# Patient Record
Sex: FEMALE | Race: WHITE | NOT HISPANIC OR LATINO | URBAN - METROPOLITAN AREA
[De-identification: names, ages, dates, MRNs, and addresses within clinical notes are randomized per-mention and may not be internally consistent; named-entity substitution may affect disease eponyms.]

---

## 2024-04-05 ENCOUNTER — INPATIENT (INPATIENT)
Facility: HOSPITAL | Age: 74
LOS: 5 days | Discharge: HOME | DRG: 690 | End: 2024-04-11
Attending: STUDENT IN AN ORGANIZED HEALTH CARE EDUCATION/TRAINING PROGRAM | Admitting: INTERNAL MEDICINE
Payer: MEDICARE

## 2024-04-05 VITALS
WEIGHT: 222.67 LBS | TEMPERATURE: 97 F | RESPIRATION RATE: 16 BRPM | HEIGHT: 65 IN | HEART RATE: 91 BPM | DIASTOLIC BLOOD PRESSURE: 84 MMHG | SYSTOLIC BLOOD PRESSURE: 151 MMHG | OXYGEN SATURATION: 88 %

## 2024-04-05 DIAGNOSIS — Z98.890 OTHER SPECIFIED POSTPROCEDURAL STATES: Chronic | ICD-10-CM

## 2024-04-05 DIAGNOSIS — Z95.1 PRESENCE OF AORTOCORONARY BYPASS GRAFT: Chronic | ICD-10-CM

## 2024-04-05 DIAGNOSIS — N39.0 URINARY TRACT INFECTION, SITE NOT SPECIFIED: ICD-10-CM

## 2024-04-05 LAB
ALBUMIN SERPL ELPH-MCNC: 3.7 G/DL — SIGNIFICANT CHANGE UP (ref 3.3–5.2)
ALP SERPL-CCNC: 142 U/L — HIGH (ref 40–120)
ALT FLD-CCNC: 9 U/L — SIGNIFICANT CHANGE UP
ANION GAP SERPL CALC-SCNC: 11 MMOL/L — SIGNIFICANT CHANGE UP (ref 5–17)
APPEARANCE UR: ABNORMAL
APTT BLD: 31.8 SEC — SIGNIFICANT CHANGE UP (ref 24.5–35.6)
AST SERPL-CCNC: 11 U/L — SIGNIFICANT CHANGE UP
BACTERIA # UR AUTO: ABNORMAL /HPF
BASE EXCESS BLDV CALC-SCNC: 5.2 MMOL/L — HIGH (ref -2–3)
BASOPHILS # BLD AUTO: 0.03 K/UL — SIGNIFICANT CHANGE UP (ref 0–0.2)
BASOPHILS NFR BLD AUTO: 0.2 % — SIGNIFICANT CHANGE UP (ref 0–2)
BILIRUB SERPL-MCNC: 0.4 MG/DL — SIGNIFICANT CHANGE UP (ref 0.4–2)
BILIRUB UR-MCNC: NEGATIVE — SIGNIFICANT CHANGE UP
BUN SERPL-MCNC: 27.5 MG/DL — HIGH (ref 8–20)
CA-I SERPL-SCNC: 1.2 MMOL/L — SIGNIFICANT CHANGE UP (ref 1.15–1.33)
CALCIUM SERPL-MCNC: 9.1 MG/DL — SIGNIFICANT CHANGE UP (ref 8.4–10.5)
CAST: 7 /LPF — HIGH (ref 0–4)
CHLORIDE BLDV-SCNC: 101 MMOL/L — SIGNIFICANT CHANGE UP (ref 96–108)
CHLORIDE SERPL-SCNC: 100 MMOL/L — SIGNIFICANT CHANGE UP (ref 96–108)
CO2 SERPL-SCNC: 29 MMOL/L — SIGNIFICANT CHANGE UP (ref 22–29)
COLOR SPEC: YELLOW — SIGNIFICANT CHANGE UP
CREAT SERPL-MCNC: 1.16 MG/DL — SIGNIFICANT CHANGE UP (ref 0.5–1.3)
DIFF PNL FLD: NEGATIVE — SIGNIFICANT CHANGE UP
EGFR: 49 ML/MIN/1.73M2 — LOW
EOSINOPHIL # BLD AUTO: 0.05 K/UL — SIGNIFICANT CHANGE UP (ref 0–0.5)
EOSINOPHIL NFR BLD AUTO: 0.4 % — SIGNIFICANT CHANGE UP (ref 0–6)
FLUAV AG NPH QL: SIGNIFICANT CHANGE UP
FLUBV AG NPH QL: SIGNIFICANT CHANGE UP
GAS PNL BLDV: 137 MMOL/L — SIGNIFICANT CHANGE UP (ref 136–145)
GAS PNL BLDV: SIGNIFICANT CHANGE UP
GLUCOSE BLDV-MCNC: 179 MG/DL — HIGH (ref 70–99)
GLUCOSE SERPL-MCNC: 170 MG/DL — HIGH (ref 70–99)
GLUCOSE UR QL: NEGATIVE MG/DL — SIGNIFICANT CHANGE UP
HCO3 BLDV-SCNC: 32 MMOL/L — HIGH (ref 22–29)
HCT VFR BLD CALC: 40.7 % — SIGNIFICANT CHANGE UP (ref 34.5–45)
HCT VFR BLDA CALC: 37 % — SIGNIFICANT CHANGE UP
HGB BLD CALC-MCNC: 12.4 G/DL — SIGNIFICANT CHANGE UP (ref 11.7–16.1)
HGB BLD-MCNC: 12.1 G/DL — SIGNIFICANT CHANGE UP (ref 11.5–15.5)
IMM GRANULOCYTES NFR BLD AUTO: 0.4 % — SIGNIFICANT CHANGE UP (ref 0–0.9)
INR BLD: 1.09 RATIO — SIGNIFICANT CHANGE UP (ref 0.85–1.18)
KETONES UR-MCNC: NEGATIVE MG/DL — SIGNIFICANT CHANGE UP
LACTATE BLDV-MCNC: 2.3 MMOL/L — HIGH (ref 0.5–2)
LEUKOCYTE ESTERASE UR-ACNC: ABNORMAL
LYMPHOCYTES # BLD AUTO: 1.58 K/UL — SIGNIFICANT CHANGE UP (ref 1–3.3)
LYMPHOCYTES # BLD AUTO: 11.5 % — LOW (ref 13–44)
MCHC RBC-ENTMCNC: 28.2 PG — SIGNIFICANT CHANGE UP (ref 27–34)
MCHC RBC-ENTMCNC: 29.7 GM/DL — LOW (ref 32–36)
MCV RBC AUTO: 94.9 FL — SIGNIFICANT CHANGE UP (ref 80–100)
MONOCYTES # BLD AUTO: 0.97 K/UL — HIGH (ref 0–0.9)
MONOCYTES NFR BLD AUTO: 7.1 % — SIGNIFICANT CHANGE UP (ref 2–14)
NEUTROPHILS # BLD AUTO: 11.05 K/UL — HIGH (ref 1.8–7.4)
NEUTROPHILS NFR BLD AUTO: 80.4 % — HIGH (ref 43–77)
NITRITE UR-MCNC: NEGATIVE — SIGNIFICANT CHANGE UP
NT-PROBNP SERPL-SCNC: 7419 PG/ML — HIGH (ref 0–300)
PCO2 BLDV: 70 MMHG — HIGH (ref 39–42)
PH BLDV: 7.27 — LOW (ref 7.32–7.43)
PH UR: 8.5 (ref 5–8)
PLATELET # BLD AUTO: 234 K/UL — SIGNIFICANT CHANGE UP (ref 150–400)
PO2 BLDV: 63 MMHG — HIGH (ref 25–45)
POTASSIUM BLDV-SCNC: 4.4 MMOL/L — SIGNIFICANT CHANGE UP (ref 3.5–5.1)
POTASSIUM SERPL-MCNC: 4.5 MMOL/L — SIGNIFICANT CHANGE UP (ref 3.5–5.3)
POTASSIUM SERPL-SCNC: 4.5 MMOL/L — SIGNIFICANT CHANGE UP (ref 3.5–5.3)
PROT SERPL-MCNC: 7 G/DL — SIGNIFICANT CHANGE UP (ref 6.6–8.7)
PROT UR-MCNC: 100 MG/DL
PROTHROM AB SERPL-ACNC: 12.1 SEC — SIGNIFICANT CHANGE UP (ref 9.5–13)
RBC # BLD: 4.29 M/UL — SIGNIFICANT CHANGE UP (ref 3.8–5.2)
RBC # FLD: 16 % — HIGH (ref 10.3–14.5)
RBC CASTS # UR COMP ASSIST: 3 /HPF — SIGNIFICANT CHANGE UP (ref 0–4)
RSV RNA NPH QL NAA+NON-PROBE: SIGNIFICANT CHANGE UP
SAO2 % BLDV: 91.4 % — SIGNIFICANT CHANGE UP
SARS-COV-2 RNA SPEC QL NAA+PROBE: SIGNIFICANT CHANGE UP
SODIUM SERPL-SCNC: 140 MMOL/L — SIGNIFICANT CHANGE UP (ref 135–145)
SP GR SPEC: 1.02 — SIGNIFICANT CHANGE UP (ref 1–1.03)
SQUAMOUS # UR AUTO: >36 /HPF — HIGH (ref 0–5)
TROPONIN T, HIGH SENSITIVITY RESULT: 62 NG/L — HIGH (ref 0–51)
TROPONIN T, HIGH SENSITIVITY RESULT: 70 NG/L — HIGH (ref 0–51)
UROBILINOGEN FLD QL: 1 MG/DL — SIGNIFICANT CHANGE UP (ref 0.2–1)
WBC # BLD: 13.74 K/UL — HIGH (ref 3.8–10.5)
WBC # FLD AUTO: 13.74 K/UL — HIGH (ref 3.8–10.5)
WBC UR QL: 440 /HPF — HIGH (ref 0–5)

## 2024-04-05 PROCEDURE — 71275 CT ANGIOGRAPHY CHEST: CPT | Mod: 26,MC

## 2024-04-05 PROCEDURE — 99223 1ST HOSP IP/OBS HIGH 75: CPT | Mod: GC

## 2024-04-05 PROCEDURE — 93010 ELECTROCARDIOGRAM REPORT: CPT

## 2024-04-05 PROCEDURE — 71045 X-RAY EXAM CHEST 1 VIEW: CPT | Mod: 26

## 2024-04-05 PROCEDURE — 74177 CT ABD & PELVIS W/CONTRAST: CPT | Mod: 26,MC

## 2024-04-05 PROCEDURE — 99285 EMERGENCY DEPT VISIT HI MDM: CPT | Mod: GC

## 2024-04-05 RX ORDER — ALPRAZOLAM 0.25 MG
1 TABLET ORAL
Refills: 0 | DISCHARGE

## 2024-04-05 RX ORDER — GABAPENTIN 400 MG/1
1 CAPSULE ORAL
Refills: 0 | DISCHARGE

## 2024-04-05 RX ORDER — TIZANIDINE 4 MG/1
1 TABLET ORAL
Refills: 0 | DISCHARGE

## 2024-04-05 RX ORDER — LANOLIN ALCOHOL/MO/W.PET/CERES
3 CREAM (GRAM) TOPICAL AT BEDTIME
Refills: 0 | Status: DISCONTINUED | OUTPATIENT
Start: 2024-04-05 | End: 2024-04-11

## 2024-04-05 RX ORDER — LEVOTHYROXINE SODIUM 125 MCG
1 TABLET ORAL
Refills: 0 | DISCHARGE

## 2024-04-05 RX ORDER — CEFTRIAXONE 500 MG/1
1000 INJECTION, POWDER, FOR SOLUTION INTRAMUSCULAR; INTRAVENOUS EVERY 24 HOURS
Refills: 0 | Status: DISCONTINUED | OUTPATIENT
Start: 2024-04-06 | End: 2024-04-07

## 2024-04-05 RX ORDER — ONDANSETRON 8 MG/1
4 TABLET, FILM COATED ORAL EVERY 8 HOURS
Refills: 0 | Status: DISCONTINUED | OUTPATIENT
Start: 2024-04-05 | End: 2024-04-11

## 2024-04-05 RX ORDER — BUDESONIDE, GLYCOPYRROLATE, AND FORMOTEROL FUMARATE 160; 9; 4.8 UG/1; UG/1; UG/1
2 AEROSOL, METERED RESPIRATORY (INHALATION)
Refills: 0 | DISCHARGE

## 2024-04-05 RX ORDER — PROCHLORPERAZINE MALEATE 5 MG
1 TABLET ORAL
Refills: 0 | DISCHARGE

## 2024-04-05 RX ORDER — EZETIMIBE 10 MG/1
1 TABLET ORAL
Refills: 0 | DISCHARGE

## 2024-04-05 RX ORDER — ACETAMINOPHEN 500 MG
650 TABLET ORAL EVERY 6 HOURS
Refills: 0 | Status: DISCONTINUED | OUTPATIENT
Start: 2024-04-05 | End: 2024-04-11

## 2024-04-05 RX ORDER — MONTELUKAST 4 MG/1
1 TABLET, CHEWABLE ORAL
Refills: 0 | DISCHARGE

## 2024-04-05 RX ORDER — ENOXAPARIN SODIUM 100 MG/ML
40 INJECTION SUBCUTANEOUS EVERY 24 HOURS
Refills: 0 | Status: DISCONTINUED | OUTPATIENT
Start: 2024-04-05 | End: 2024-04-11

## 2024-04-05 RX ORDER — SPIRONOLACTONE 25 MG/1
1 TABLET, FILM COATED ORAL
Refills: 0 | DISCHARGE

## 2024-04-05 RX ORDER — DICLOFENAC SODIUM 30 MG/G
1 GEL TOPICAL
Refills: 0 | DISCHARGE

## 2024-04-05 RX ORDER — METOPROLOL TARTRATE 50 MG
1 TABLET ORAL
Refills: 0 | DISCHARGE

## 2024-04-05 RX ORDER — ACETAMINOPHEN 500 MG
1000 TABLET ORAL ONCE
Refills: 0 | Status: COMPLETED | OUTPATIENT
Start: 2024-04-05 | End: 2024-04-05

## 2024-04-05 RX ORDER — TRAZODONE HCL 50 MG
1 TABLET ORAL
Refills: 0 | DISCHARGE

## 2024-04-05 RX ORDER — INSULIN GLARGINE 100 [IU]/ML
45 INJECTION, SOLUTION SUBCUTANEOUS
Refills: 0 | DISCHARGE

## 2024-04-05 RX ORDER — ATORVASTATIN CALCIUM 80 MG/1
1 TABLET, FILM COATED ORAL
Refills: 0 | DISCHARGE

## 2024-04-05 RX ORDER — OMEPRAZOLE 10 MG/1
1 CAPSULE, DELAYED RELEASE ORAL
Refills: 0 | DISCHARGE

## 2024-04-05 RX ORDER — SODIUM CHLORIDE 9 MG/ML
1000 INJECTION INTRAMUSCULAR; INTRAVENOUS; SUBCUTANEOUS ONCE
Refills: 0 | Status: COMPLETED | OUTPATIENT
Start: 2024-04-05 | End: 2024-04-05

## 2024-04-05 RX ORDER — SODIUM CHLORIDE 9 MG/ML
1000 INJECTION INTRAMUSCULAR; INTRAVENOUS; SUBCUTANEOUS ONCE
Refills: 0 | Status: DISCONTINUED | OUTPATIENT
Start: 2024-04-05 | End: 2024-04-05

## 2024-04-05 RX ORDER — MORPHINE SULFATE 50 MG/1
4 CAPSULE, EXTENDED RELEASE ORAL ONCE
Refills: 0 | Status: DISCONTINUED | OUTPATIENT
Start: 2024-04-05 | End: 2024-04-05

## 2024-04-05 RX ORDER — METFORMIN HYDROCHLORIDE 850 MG/1
1 TABLET ORAL
Refills: 0 | DISCHARGE

## 2024-04-05 RX ORDER — CEFTRIAXONE 500 MG/1
1000 INJECTION, POWDER, FOR SOLUTION INTRAMUSCULAR; INTRAVENOUS ONCE
Refills: 0 | Status: COMPLETED | OUTPATIENT
Start: 2024-04-05 | End: 2024-04-05

## 2024-04-05 RX ORDER — FOLIC ACID 0.8 MG
1 TABLET ORAL
Refills: 0 | DISCHARGE

## 2024-04-05 RX ADMIN — SODIUM CHLORIDE 1000 MILLILITER(S): 9 INJECTION INTRAMUSCULAR; INTRAVENOUS; SUBCUTANEOUS at 12:46

## 2024-04-05 RX ADMIN — MORPHINE SULFATE 4 MILLIGRAM(S): 50 CAPSULE, EXTENDED RELEASE ORAL at 18:39

## 2024-04-05 RX ADMIN — MORPHINE SULFATE 4 MILLIGRAM(S): 50 CAPSULE, EXTENDED RELEASE ORAL at 16:41

## 2024-04-05 RX ADMIN — CEFTRIAXONE 1000 MILLIGRAM(S): 500 INJECTION, POWDER, FOR SOLUTION INTRAMUSCULAR; INTRAVENOUS at 12:46

## 2024-04-05 NOTE — H&P ADULT - NSHPPHYSICALEXAM_GEN_ALL_CORE
Vital Signs Last 24 Hrs  T(C): 36.4 (05 Apr 2024 21:23), Max: 36.9 (05 Apr 2024 12:27)  T(F): 97.6 (05 Apr 2024 21:23), Max: 98.5 (05 Apr 2024 12:27)  HR: 79 (05 Apr 2024 21:23) (74 - 91)  BP: 136/77 (05 Apr 2024 21:23) (126/60 - 151/84)  BP(mean): --  RR: 19 (05 Apr 2024 21:23) (16 - 19)  SpO2: 97% (05 Apr 2024 21:23) (85% - 98%)    Parameters below as of 05 Apr 2024 21:23  Patient On (Oxygen Delivery Method): room air    GENERAL: Obese, awake, alert, oriented to time, place and person, nasal cannula in place, appears to be in mild distress due to pain.  HEENT: Atraumatic, normocephalic, non-icteric, no JVD  NEURO: No focal deficits, moving all extremities spontaneously, A&Ox3, no dysarthria, CN II-XII grossly intact  PSYCH: Normal affect, calm, appropriate insight and judgment, fluent speech  LUNGS: CTAB, no wrr, non-labored breathing  HEART: RRR, no murmur appreciated  ABD: Soft, tender to palpation in LLQ, non-distended, no organomegaly, no appreciable masses, +bs all 4 quadrants  EXTREMITIES: Nontender, no clubbing, cyanosis, or edema  SKIN: No rashes or lesions  LYMPH: No adenopathy or splenomegaly Vital Signs Last 24 Hrs  T(C): 36.4 (05 Apr 2024 21:23), Max: 36.9 (05 Apr 2024 12:27)  T(F): 97.6 (05 Apr 2024 21:23), Max: 98.5 (05 Apr 2024 12:27)  HR: 79 (05 Apr 2024 21:23) (74 - 91)  BP: 136/77 (05 Apr 2024 21:23) (126/60 - 151/84)  BP(mean): --  RR: 19 (05 Apr 2024 21:23) (16 - 19)  SpO2: 97% (05 Apr 2024 21:23) (85% - 98%)    Parameters below as of 05 Apr 2024 21:23  Patient On (Oxygen Delivery Method): room air    GENERAL: Obese, awake, alert, oriented to time, place and person, nasal cannula in place, appears to be in mild distress due to pain.  HEENT: Atraumatic, normocephalic, non-icteric, no JVD  NEURO: No focal deficits, moving all extremities spontaneously, A&Ox3, no dysarthria, right hand tremor noted  PSYCH: Normal affect, calm, appropriate insight and judgment, fluent speech  LUNGS: CTAB, no wrr  HEART: RRR, no murmur appreciated  ABD: Soft, tender to palpation in LLQ, non-distended, no organomegaly, no appreciable masses, +bs all 4 quadrants  EXTREMITIES: Nontender, no clubbing, cyanosis, or edema  SKIN: No rashes or lesions  LYMPH: No adenopathy or splenomegaly

## 2024-04-05 NOTE — H&P ADULT - NSHPLABSRESULTS_GEN_ALL_CORE
LABS:                          12.1   13.74 )-----------( 234      ( 2024 12:20 )             40.7     04-    140  |  100  |  27.5<H>  ----------------------------<  170<H>  4.5   |  29.0  |  1.16    Ca    9.1      2024 12:20    TPro  7.0  /  Alb  3.7  /  TBili  0.4  /  DBili  x   /  AST  11  /  ALT  9   /  AlkPhos  142<H>  04-05    LIVER FUNCTIONS - ( 2024 12:20 )  Alb: 3.7 g/dL / Pro: 7.0 g/dL / ALK PHOS: 142 U/L / ALT: 9 U/L / AST: 11 U/L / GGT: x           PT/INR - ( 2024 12:20 )   PT: 12.1 sec;   INR: 1.09 ratio         PTT - ( 2024 12:20 )  PTT:31.8 sec  Urinalysis Basic - ( 2024 17:28 )    Color: Yellow / Appearance: Turbid / S.025 / pH: x  Gluc: x / Ketone: Negative mg/dL  / Bili: Negative / Urobili: 1.0 mg/dL   Blood: x / Protein: 100 mg/dL / Nitrite: Negative   Leuk Esterase: Large / RBC: 3 /HPF /  /HPF   Sq Epi: x / Non Sq Epi: >36 /HPF / Bacteria: Many /HPF      < from: CT Abdomen and Pelvis w/ IV Cont (24 @ 16:11) >        < end of copied text >    < from: CT Abdomen and Pelvis w/ IV Cont (24 @ 16:11) >    IMPRESSION:  No pulmonary embolism.    Dilated main pulmonary artery measuring 3.9 cm, a nonspecific finding   which can be seen in the setting of pulmonary hypertension.    Possible 3.5 cm right adnexal mass. Additionally, there is fluid and   debris within the endometrial canal which is an atypical finding fora   patient of this age. Recommend pelvic ultrasound for further assessment.   Neoplasm cannot be excluded.    --- End of Report ---    < end of copied text >

## 2024-04-05 NOTE — ED PROVIDER NOTE - CLINICAL SUMMARY MEDICAL DECISION MAKING FREE TEXT BOX
Sepsis/UTI work up, r/o PE. Will check cbc, cmp, coags, trop T, cxr, UA. ECG. Will check vbg/lac, swab, cultures. Will check CTA chest, CT ab/p with iv con. Given Rocephin 1g, IVF. Symptom management and reassess.

## 2024-04-05 NOTE — ED PROVIDER NOTE - OBJECTIVE STATEMENT
A 72 yo F with hx of chronic lower back pain 2/2 spinal stenosis and sciatica, hx of chronic UTI, DM, CABG 5 years ago, not on AC, SAS on CPAP prn at night.  presents to the ED c/o L francine pain and dysuria since 2-3 days ago. Also reports shortness of breath and LLQ abd pain. Denies fevers, chills, headache, chest pain, palpitations, cough, nausea, vomiting, diarrhea, hematuria, dark stools, focal neurologic symptoms.

## 2024-04-05 NOTE — ED ADULT TRIAGE NOTE - CHIEF COMPLAINT QUOTE
as per  pt has been having chronic UTI last 4 days was severe, pt on antibionts po with no relief, also pt might be hypoxia because she is lethargy and confused for about 1 day. pt uses CPAP at night and on oxygen PRN.

## 2024-04-05 NOTE — H&P ADULT - ASSESSMENT
73F with PMHx Chronic lower back pain secondary to Spinal stenosis and Sciatica, Neuropathy, Recurrent UTIs, DM, CAD s/p CABGx4 (2019) and CardioMEMS, ROMANA on CPAP, Hypothyroidism, Carpal tunnel syndrome presents to the ED with complaint of L flank pain and dysuria. Associated shortness of breath. CT showing elevated Trops, positive UA, CT scan with right adnexal mass.     73F with PMHx Chronic lower back pain secondary to Spinal stenosis and Sciatica, Neuropathy, Recurrent UTIs, DM, CAD s/p CABGx4 (2019) and CardioMEMS, ROMANA on CPAP, Hypothyroidism, Carpal tunnel syndrome presents to the ED with complaint of L flank pain and dysuria. Associated shortness of breath. CT showing elevated Trops, positive UA, CT scan with right adnexal mass.    # UTI  - UA positive   73F with PMHx Chronic lower back pain secondary to Spinal stenosis and Sciatica, Neuropathy, Recurrent UTIs, DM, CAD s/p CABGx4 (2019) and CardioMEMS, ROMANA on CPAP, Hypothyroidism, Carpal tunnel syndrome presents to the ED with complaint of L flank pain and dysuria. Associated shortness of breath. CT showing elevated Trops, positive UA, CT scan with right adnexal mass.    # Recurrent UTI  - UA positive  - CT negative for pyelo, showing right adnexal mass  - F/u BCx , UCx  - CXR  - Continue Ceftriaxone  - Follow up cultures  - Trend WBC       73F with PMHx Chronic lower back pain secondary to Spinal stenosis and Sciatica, Neuropathy, Recurrent UTIs, DM, CAD s/p CABGx4 (2019) and CardioMEMS, ROMANA on CPAP, Hypothyroidism, Carpal tunnel syndrome presents to the ED with complaint of L flank pain and dysuria. Associated shortness of breath. CT showing elevated Trops, positive UA, CT scan with right adnexal mass.    PLAN:  # Recurrent UTI  - UA positive  - CT negative for pyelo, showing right adnexal mass  - F/u BCx , UCx  - Continue Ceftriaxone  - Follow up cultures  - Leukocytosis  - Trend WBC    # CAD w/ elevated Troponin  - Troponin 70 on admission, 62 on    # Acute hypoxic respiratory failure   - Patient with history of ROMANA and noncompliance with CPAP  - Lactate elevated in the ED normalized s/p IV fluid bolus   73F with PMHx Chronic lower back pain secondary to Spinal stenosis and Sciatica, Neuropathy, Recurrent UTIs, DM, CAD s/p CABGx4 (2019) and CardioMEMS, ROMANA on CPAP, Hypothyroidism, Carpal tunnel syndrome presents to the ED with complaint of L flank pain and dysuria. Associated shortness of breath. CT showing elevated Trops, positive UA, CT scan with right adnexal mass.    PLAN:  # Recurrent UTI  - UA positive  - CT negative for pyelo, showing right adnexal mass  - F/u BCx , UCx  - Continue Ceftriaxone  - Follow up cultures  - Leukocytosis  - Trend WBC    # CAD w/ elevated Troponin  - Troponin 70 on admission, 62 on repeat  - No evidence of volume overload  - TTE ordered to assess LV function  - Seen by cardiology, recs appreciated    # Acute hypoxic respiratory failure   - Patient with history of ROMANA and noncompliance with CPAP  - Lactate elevated in the ED normalized s/p IV fluid bolus   73F with PMHx Chronic lower back pain secondary to Spinal stenosis and Sciatica, Neuropathy, Recurrent UTIs, DM, CAD s/p CABGx4 (2019) and CardioMEMS, ROMANA on CPAP, Hypothyroidism, Carpal tunnel syndrome presents to the ED with complaint of L flank pain and dysuria. Associated shortness of breath. CT showing elevated Trops, positive UA, CT scan with right adnexal mass.    PLAN:  # Recurrent UTI  - UA positive  - CT negative for pyelo, showing right adnexal mass  - F/u BCx , UCx  - Continue Ceftriaxone  - Follow up cultures  - Leukocytosis  - Trend WBC    # CAD w/ elevated Troponin  - Troponin 70 on admission, 62 on repeat  - No evidence of volume overload  - TTE ordered to assess LV function  - Seen by cardiology, recs appreciated    # Acute hypoxic respiratory failure   - Patient with history of ROMANA and noncompliance with CPAP  - Lactate elevated in the ED normalized s/p IV fluid bolus  - Supplemental O2 wean as tolerated  - Nocturnal BIPAP  - Symbicort      # Chronic back pain  # Spinal stenosis  # Sciatica  - Continue Gabapentin  - Continue Percocet    # Diabetes  - HbA1c  - Takes Lantus 45 at home, Will order Lantus 20 Units daily  - ISS moderate scale  - Hold Metformin   73F with PMHx Chronic lower back pain secondary to Spinal stenosis and Sciatica, Neuropathy, Recurrent UTIs, DM, HFpEF, CAD s/p CABGx4 (2019) and CardioMEMS, ROMANA on CPAP, Hypothyroidism, Carpal tunnel syndrome presents to the ED with complaint of L flank pain and dysuria. Associated shortness of breath. CT showing elevated Trops, positive UA, CT scan with right adnexal mass.    PLAN:  # Recurrent UTI  - UA positive  - CT negative for pyelo, showing right adnexal mass  - F/u BCx , UCx  - Continue Ceftriaxone  - Follow up cultures  - Leukocytosis 13.74 on admission, 11.35 on repeat  - Trend WBC    # CAD w/ elevated Troponin  # HFpEF  - Troponin 70 on admission, 62 on repeat  - Xray suggestive of heart enlargement  - No evidence of volume overload  - TTE ordered to assess LV function  - Seen by cardiology, recs appreciated  - Cardio to evaluate CardioMEMS  - Continue Metoprolol, Spironolactone, Torsemide    # Acute hypoxic respiratory failure   # Obstructive sleep apnea  - Noncompliance with CPAP  - Lactate elevated in the ED normalized s/p IV fluid bolus  - Supplemental O2 wean as tolerated  - Nocturnal BIPAP  - Symbicort      # Chronic back pain  # Spinal stenosis  # Sciatica  - Continue Gabapentin  - Continue Percocet    # Diabetes  - HbA1c  - Takes Lantus 45 at home, Will order Lantus 20 Units daily  - ISS moderate scale  - Hold Metformin    # HLD  - Continue Atorvastatin    # Hypothyroidism  - Continue Levothyroxine    VTE ppx: Lovenox  Diet: DASH/TLC, Consistent carb   73F with PMHx Chronic lower back pain secondary to Spinal stenosis and Sciatica, Neuropathy, Recurrent UTIs, DM, HFpEF, CAD s/p CABGx4 (2019) and CardioMEMS, ROMANA on CPAP, Hypothyroidism, Carpal tunnel syndrome presents to the ED with complaint of L flank pain and dysuria. Associated shortness of breath. CT showing elevated Trops, positive UA, CT scan with right adnexal mass.    PLAN:  # Recurrent UTI  - UA positive  - CT negative for pyelo, showing right adnexal mass  - F/u BCx , UCx  - Continue Ceftriaxone  - Follow up cultures  - Abfebrile  - Leukocytosis 13.74 on admission, 11.35 on repeat  - Trend WBC    # CAD w/ elevated Troponin  # HFpEF  - Troponin 70 on admission, 62 on repeat  - Xray suggestive of heart enlargement  - No evidence of volume overload  - TTE ordered to assess LV function  - Seen by cardiology, recs appreciated  - Cardio to evaluate CardioMEMS  - Continue Metoprolol, Spironolactone, Torsemide    # Acute hypoxic Hypercapnic respiratory failure   # Obstructive sleep apnea  - Noncompliance with CPAP  - Lactate elevated in the ED normalized s/p IV fluid bolus  - Supplemental O2 wean as tolerated  - Hypercapnia on admission  - Nocturnal BIPAP  - Repeat ABG post BIPAP  - Symbicort, Montelukast     # Right adnexal mass  - Pelvic ultrasound    # Chronic back pain  # Spinal stenosis  # Sciatica  - Continue Gabapentin  - Continue Percocet    # Diabetes  - HbA1c  - Takes Lantus 45 at home, Will order Lantus 20 Units daily  - ISS moderate scale  - Carb consistent diet  - Hold Metformin    # HLD  - Continue Atorvastatin    # Hypothyroidism  - Continue Levothyroxine    # Anxiety   - PRN Xanax    # Rash  - Nystatin powder    - Fall precautions given previous hx of fall    VTE ppx: Lovenox  Diet: DASH/TLC, Consistent carb   73F with PMHx Chronic lower back pain secondary to Spinal stenosis and Sciatica, Neuropathy, Recurrent UTIs, DM, HFpEF, CAD s/p CABGx4 (2019) and CardioMEMS, ROMANA on CPAP, Hypothyroidism, Carpal tunnel syndrome presents to the ED with complaint of L flank pain and dysuria. Associated shortness of breath. CT showing elevated Trops, positive UA, CT scan with right adnexal mass.    PLAN:  # Recurrent UTI  - UA positive  - CT negative for pyelo, showing right adnexal mass  - F/u BCx , UCx  - Continue Ceftriaxone  - Follow up cultures  - Abfebrile  - Leukocytosis 13.74 on admission, 11.35 on repeat  - Trend WBC    # CAD w/ elevated Troponin  # HFpEF  - Troponin 70 on admission, 62 on repeat  - Xray suggestive of heart enlargement  - No evidence of volume overload  - TTE ordered to assess LV function  - Seen by cardiology, recs appreciated  - Cardio to evaluate CardioMEMS  - Continue Metoprolol, Spironolactone, Torsemide    # Acute hypoxic Hypercapnic respiratory failure   # Obstructive sleep apnea  # Pulm HTN  - Noncompliance with CPAP  - Lactate elevated in the ED normalized s/p IV fluid bolus  - Supplemental O2 wean as tolerated  - Hypercapnia on admission  - Nocturnal BIPAP  - Repeat ABG post BIPAP  - Symbicort, Montelukast     # Right adnexal mass  - Pelvic ultrasound    # Chronic back pain  # Spinal stenosis  # Sciatica  - Continue Gabapentin  - Continue Percocet    # Diabetes  - HbA1c  - Takes Lantus 45 at home, Will order Lantus 20 Units daily  - ISS moderate scale  - Carb consistent diet  - Hold Metformin    # HLD  - Continue Atorvastatin    # Hypothyroidism  - Continue Levothyroxine    # Anxiety   - PRN Xanax    # Rash  - Nystatin powder    - Fall precautions given previous hx of fall    VTE ppx: Lovenox  Diet: DASH/TLC, Consistent carb

## 2024-04-05 NOTE — CONSULT NOTE ADULT - SUBJECTIVE AND OBJECTIVE BOX
[place]                                      [facility]                              [address] [place] [address]                                                 PHONE: [phone]                                                 FAX: [phone]  ------------------------------------------------------------------------------------------------    Chief complaint: weakness    73y Female with past medical history as under presents to the ED c/o L francine pain and dysuria since 2-3 days ago. Also reports shortness of breath and LLQ abd pain.  at bedside provides most of the history. He reports that pt was being treated for UTI and he noted that pt was getting more weak and lethargic.   At the time of evaluation, pt remains in bed and is arousable but goes back to sleep.    PAST MEDICAL & SURGICAL HISTORY:      penicillin (Unknown)  Dilaudid (Unknown)  Kiwi (Unknown)      Vital Signs Last 24 Hrs  T(C): 36.9 (05 Apr 2024 12:27), Max: 36.9 (05 Apr 2024 12:27)  T(F): 98.5 (05 Apr 2024 12:27), Max: 98.5 (05 Apr 2024 12:27)  HR: 85 (05 Apr 2024 12:27) (85 - 91)  BP: 126/60 (05 Apr 2024 12:27) (126/60 - 151/84)  BP(mean): --  RR: 18 (05 Apr 2024 12:27) (16 - 18)  SpO2: 95% (05 Apr 2024 12:27) (85% - 95%)    Parameters below as of 05 Apr 2024 12:27  Patient On (Oxygen Delivery Method): room air        RELEVANT PHYSICAL EXAM:  Neck: No obvious JVD  Cardiovascular: regular S1, S2  Respiratory: Lungs clear to auscultation; no crepitations, no wheeze  Musculoskeletal: No edema    LABS:                        12.1   13.74 )-----------( 234      ( 05 Apr 2024 12:20 )             40.7     04-05    140  |  100  |  27.5<H>  ----------------------------<  170<H>  4.5   |  29.0  |  1.16    Ca    9.1      05 Apr 2024 12:20    TPro  7.0  /  Alb  3.7  /  TBili  0.4  /  DBili  x   /  AST  11  /  ALT  9   /  AlkPhos  142<H>  04-05    PT/INR - ( 05 Apr 2024 12:20 )   PT: 12.1 sec;   INR: 1.09 ratio         PTT - ( 05 Apr 2024 12:20 )  PTT:31.8 sec    RADIOLOGY & ADDITIONAL STUDIES: (reviewed)  CXR was independently visualized/reviewed and demonstrated: cardiomegaly    CARDIOLOGY TESTING:(reviewed)     ECG (Independent visualization): NSR with PRWP    # Echo:  Echo 11/17/2021 - LVEF 60-65%, IVSD 1.0, PW 0.9, RV nl, trace MR, no AI, trace TR      # RHC/Cardiomems 12/1/2022  Phase I - Baseline 3L O2  BP:175/81/105  PA 47/17/28  W 20      # LHC:  8/13/2020  AO: 128/61 87  LV: 125/13 Left Ventricular End Diastolic Pressure: 22  RA: 14/15 12  RV: 58/10 16  PA: 55/22 36  PW: 19/19 17  Chong CO: 7.46 Chong CI: 3.62  PVR: 204 TPVR: 386 SVR: 804 TSVR: 933  AO Sat: 94  PA Sat: 74     DOMINANCE: Right Dominant  LEFT MAIN: Angiographically Normal  LEFT ANTERIOR DESCENDING ARTERY:  MID LAD: is occluded  CIRCUMFLEX ARTERY:  Mild luminal irregularities less than 30%  RIGHT CORONARY ARTERY:  Mild luminal irregularities less than 30%  GRAFTS:  Sequential graft from the 1st OMto the 2nd OMis totally occluded  LIMA graft to the LAD Not visualized due to access issues limiting approach to Right  radial      # LE duplex 11/17/2021  No evidence of deep venous thrombosis in the right and left common femoral through popliteal and portion of calf tibial veins.    TELEMETRY independently visualized/reviewed and demonstrated : NSR    MEDICATIONS:(reviewed)  Home Medications:    MEDICATIONS  (STANDING):    MEDICATIONS  (PRN):      ASSESSMENT AND PLAN:    73y Female with prior history of  HFPEF, CAD s/p CABG, pHTN, ROMANA with Oct 2021 admission [facility] for chronic hypoxemic/hypercapneic respiratory failure requiring intubations/p Cardiomems  presents to the ED c/o L francine pain and dysuria since 2-3 days ago. Also reports shortness of breath and LLQ abd pain.  at bedside provides most of the history. He reports that pt was being treated for UTI and he noted that pt was getting more weak and lethargic.   Noted to have elevated troponin.                                                    [facility]                              [address] [place] [address]                                                 PHONE: [phone]                                                 FAX: [phone]  ------------------------------------------------------------------------------------------------    Chief complaint: weakness    73y Female with past medical history as under presents to the ED c/o L francine pain and dysuria since 2-3 days ago. Also reports shortness of breath and LLQ abd pain.  at bedside provides most of the history. He reports that pt was being treated for UTI and he noted that pt was getting more weak and lethargic. Noted to have elevated troponin.  At the time of evaluation, pt remains in bed and is arousable but goes back to sleep.    PAST MEDICAL & SURGICAL HISTORY:      penicillin (Unknown)  Dilaudid (Unknown)  Kiwi (Unknown)      Vital Signs Last 24 Hrs  T(C): 36.9 (05 Apr 2024 12:27), Max: 36.9 (05 Apr 2024 12:27)  T(F): 98.5 (05 Apr 2024 12:27), Max: 98.5 (05 Apr 2024 12:27)  HR: 85 (05 Apr 2024 12:27) (85 - 91)  BP: 126/60 (05 Apr 2024 12:27) (126/60 - 151/84)  BP(mean): --  RR: 18 (05 Apr 2024 12:27) (16 - 18)  SpO2: 95% (05 Apr 2024 12:27) (85% - 95%)    Parameters below as of 05 Apr 2024 12:27  Patient On (Oxygen Delivery Method): room air        RELEVANT PHYSICAL EXAM:  Neck: No obvious JVD  Cardiovascular: regular S1, S2  Respiratory: Lungs clear to auscultation; no crepitations, no wheeze  Musculoskeletal: No edema    LABS:                        12.1   13.74 )-----------( 234      ( 05 Apr 2024 12:20 )             40.7     04-05    140  |  100  |  27.5<H>  ----------------------------<  170<H>  4.5   |  29.0  |  1.16    Ca    9.1      05 Apr 2024 12:20    TPro  7.0  /  Alb  3.7  /  TBili  0.4  /  DBili  x   /  AST  11  /  ALT  9   /  AlkPhos  142<H>  04-05    PT/INR - ( 05 Apr 2024 12:20 )   PT: 12.1 sec;   INR: 1.09 ratio         PTT - ( 05 Apr 2024 12:20 )  PTT:31.8 sec    RADIOLOGY & ADDITIONAL STUDIES: (reviewed)  CXR was independently visualized/reviewed and demonstrated: cardiomegaly    CARDIOLOGY TESTING:(reviewed)     ECG (Independent visualization): NSR with PRWP    # Echo:  Echo 11/17/2021 - LVEF 60-65%, IVSD 1.0, PW 0.9, RV nl, trace MR, no AI, trace TR      # RHC/Cardiomems 12/1/2022  Phase I - Baseline 3L O2  BP:175/81/105  PA 47/17/28  W 20      # LHC:  8/13/2020  AO: 128/61 87  LV: 125/13 Left Ventricular End Diastolic Pressure: 22  RA: 14/15 12  RV: 58/10 16  PA: 55/22 36  PW: 19/19 17  Chong CO: 7.46 Chong CI: 3.62  PVR: 204 TPVR: 386 SVR: 804 TSVR: 933  AO Sat: 94  PA Sat: 74     DOMINANCE: Right Dominant  LEFT MAIN: Angiographically Normal  LEFT ANTERIOR DESCENDING ARTERY:  MID LAD: is occluded  CIRCUMFLEX ARTERY:  Mild luminal irregularities less than 30%  RIGHT CORONARY ARTERY:  Mild luminal irregularities less than 30%  GRAFTS:  Sequential graft from the 1st OMto the 2nd OMis totally occluded  LIMA graft to the LAD Not visualized due to access issues limiting approach to Right  radial      # LE duplex 11/17/2021  No evidence of deep venous thrombosis in the right and left common femoral through popliteal and portion of calf tibial veins.    TELEMETRY independently visualized/reviewed and demonstrated : NSR    MEDICATIONS:(reviewed)  Home Medications:    MEDICATIONS  (STANDING):    MEDICATIONS  (PRN):      ASSESSMENT AND PLAN:    73y Female with prior history of  HFPEF, CAD s/p CABG, pHTN, ROMANA with Oct 2021 admission [facility] for chronic hypoxemic/hypercapneic respiratory failure requiring intubations/p Cardiomems  presents to the ED c/o L francine pain and dysuria since 2-3 days ago. Also reports shortness of breath and LLQ abd pain.  at bedside provides most of the history. He reports that pt was being treated for UTI and he noted that pt was getting more weak and lethargic.   Noted to have elevated troponin.    Will need hospital admission for UTI, CAD and elevated troponin  Likely non MI troponin elevation in the setting of infection, hypoxia and prior CAD with occluded SVG- OM- Continue to trend  Will check CardioMEMS to assess volume status although no evidence of volume overload at this time  Echo to assess LV function    Plan d/w Dr. Baires    Additional history obtained from   [independent historian] and plan of care discussed at bedside

## 2024-04-05 NOTE — ED ADULT NURSE NOTE - PAIN RATING/NUMBER SCALE (0-10): ACTIVITY
"Subjective:    HPI                    Objective:    System    Physical Exam    General     ROS    Assessment/Plan:      PROGRESS  REPORT    Progress reporting period is from *** to ***.       SUBJECTIVE  Subjective changes noted by patient:  ***.  Subjective: Seeing neurosurgeon Wednesday. Today has right sided neck pain. Still complains of bilateral shoulder pain R>L. [place] the treatment last visit was helpful and has been less painful with breathing.    Current pain level is {PAIN LEVEL (SCALE OF 10):934459}  .     Previous pain level was  {PAIN LEVEL (SCALE OF 10):956751}  .   Changes in function:  {Changes in Function:467235}  Adverse reaction to treatment or activity: {Adverse reaction to treatment or activity:360673::\"None\"}    OBJECTIVE  Changes noted in objective findings:  {Changes noted in objective findings:636346}  Objective: Cervical AROM: flexion 25% loss, extension 50% loss, SB R 50% loss, SB R 25% loss, Rotation L 60 deg, Rotation R 80 deg     ASSESSMENT/PLAN  Updated problem list and treatment plan: {DIAGNOSIS/PLAN REHAB:968827}  STG/LTGs have been met or progress has been made towards goals:  {Goals met/progress made:133056::\"Yes (See Goal flow sheet completed today.)\"}  Assessment of Progress: {Assessment of progress:982573}  Self Management Plans:  {Self Management Plans:323408}  {Appropriateness of Rx:092251}  Zo continues to require the following intervention to meet STG and LTG's:  {INTERVENTION REHAB:974899}    Recommendations:  {RECOMMENDATIONS REHAB:907466}    Please refer to the daily flowsheet for treatment today, total treatment time and time spent performing 1:1 timed codes.                "
PROGRESS  REPORT    Progress reporting period is from 1/6/17 to 3/9/2017.       SUBJECTIVE  Subjective: Patient presents today with increased right sided neck pain that came on when she got out of bed. She continues to experience bilateral shoulder pain (right > left) and has intermittent numbness/tingling down the right arm. She reports she has been trying to continue to walk for activity but most recently had bilateral leg pain after trying to increase her walking time. Seeing neurosurgeon on Wednesday. [place] the treatment last visit to her right pectoral muscles and first rib was helpful and has resulted in less painful with breathing.      Changes in function:  Minimal change in function  Adverse reaction to treatment or activity: Pain is easily provoked    OBJECTIVE  Negative Spurling's test for reproduction of bilateral radicular symptoms. Numbness and tingling were reproduced today only with palpation of posterior shoulder muscles, upper trapezius, and pectoral muscles anteriorly. Continues to have decreased cervical AROM: flexion 25% loss, extension 50% loss, SB R 50% loss, SB R 25% loss, Rotation L 60 deg, Rotation R 80 deg. Decreased shoulder AROM: flexion to ~140 deg bilaterally, pain and difficulty with straight overhead press movements close to the body. Strong guarding with all movements.      ASSESSMENT/PLAN   The patient's condition is relatively unchanged and she demonstrates increased global hypersensitivity with movement, and palpation. Her response to treatment has been minimal and patient may benefit from further evaluation at this time. Formal physical therapy will be paused at this time until further evaluation has taken place.      Recommendations:  This patient would benefit from further evaluation.    Please refer to the daily flowsheet for treatment today, total treatment time and time spent performing 1:1 timed codes.  
6 (moderate pain)

## 2024-04-05 NOTE — ED PROVIDER NOTE - ATTENDING CONTRIBUTION TO CARE
I, Zhou Baires, performed a face to face bedside interview with this patient regarding history of present illness, and completed an independent physical examination. I personally made/approved the management plan and take responsibility for the patient management. I have communicated the patient’s plan of care and disposition with the resident  73-year-old female past medical history of spinal stenosis, CAD status post CABG, presents with 3 days of dysuria, left flank pain and shortness of breath.  No vomiting, diarrhea, fever, chills, chest pain.  Patient noted to be hypoxic on room air requiring nasal cannula oxygen  Gen: NAD, well appearing  CV: RRR  Pul: CTA b/l  Abd: Soft, non-distended, ttp LCVA  Neuro: no focal deficits  Pt admitted for UTI with presumptive pyelo and positive trop

## 2024-04-05 NOTE — H&P ADULT - ATTENDING COMMENTS
Agree with documentation above except for the following addendums/additions:    73F with PMHx Chronic lower back pain secondary to Spinal stenosis and Sciatica, Neuropathy, Recurrent UTIs, DM, HFpEF, CAD s/p CABGx4 (2019) and CardioMEMS, ROMANA on CPAP, Hypothyroidism, Carpal tunnel syndrome presents to the ED with complaint of dysuria/flank pain and lethargy admitted for Severe Sepsis 2/2 Acute UTI/Pyelo, elevated Troponin, and Acute on Chronic Hypercapnic Respiratory Failure.    PLAN:  Severe Sepsis 2/2 Acute UTI/Pyelo  - SIRS +Leukocytosis/tachycardia + Lactic Acidosis + UTI  - UA positive. UCX pending. BCX x2 Pending  - Lactate cleared s/p IVFB in ED  - Ceftriaxone 1g q24   - Repeat Labs  - Trend WBC    # CAD w/ elevated Troponin r/o ACS, HFpEF s/p CardioMEMS  -Trend Cardiac Enzymes  -Monitor Tele  -Check TTE  -Continue BB  -Cardiology Consulted appreciate reccs    #Hyperkalemia  -Hold Spironolactone. Trend BMP. Monitor Tele.    # Acute on Chronic Hypoxic/Hypercapnic respiratory failure 2/2 ROMANA/OHS  -Noncompliance with CPAP. VBG reviewed PCO2 70s with Lactic Acidosis  -Start NIPPV BIPAP   -Repeat ABG in AM  -Hypoxia improved on initial assessment  -Continue home meds  -If no improvement consult Pulm in AM   - compliance with home CPAP/BIPAP for ROMANA/OHS    # Right Adnexal Mass  - Incidental on CT imaging  - Pelvic ultrasound pending

## 2024-04-05 NOTE — ED ADULT NURSE NOTE - OBJECTIVE STATEMENT
assumed care of pt at 1215 in CC, MD Naranjo at bedside for eval. pt c/o new onset LLQ abd pain radiating to left flank pain and burning upon urination since yesterday. as per  pt has been dx with uti 10 days ago but has not been taking her prescribed abx "bc of the side effects". denies fevers, chills. rr even and unlabored. anox4. placed on cardiac monitor and . pt educated on plan of care, pt able to successfully teach back plan of care to RN, RN will continue to reeducate pt during hospital stay. pmh of bypass, htn and DM. negative...

## 2024-04-05 NOTE — H&P ADULT - NSICDXPASTMEDICALHX_GEN_ALL_CORE_FT
PAST MEDICAL HISTORY:  CAD (coronary artery disease)     Chronic low back pain with sciatica     Diabetes     Hypothyroidism     Neuropathy     ROMANA on CPAP     Recurrent UTI (urinary tract infection)     Spinal stenosis     Tremor of right hand

## 2024-04-05 NOTE — H&P ADULT - HISTORY OF PRESENT ILLNESS
History obtained from patient at bedside and , Kwadwo Alfred via the phone:    73F with PMHx Chronic lower back pain secondary to Spinal stenosis and Sciatica, Neuropathy, Recurrent UTIs, DM, CAD s/p CABGx4 (2019) and CardioMEMS, ROMANA on CPAP, Hypothyroidism, Carpal tunnel syndrome presents to the ED with complaint of flank pain and dysuria. According to the , 5 days back patient started having increased frequency of urination associated with foul odor, incontinence and "slimy" discharge. Also had L flank pain which is radiating to the LLQ with patient rates pain as 9/10. Due to her prior history of recurrent UTIs, patient was prescribed oral antibiotics which she did not take due to nausea.  noticed that patient over the last few days became progressively more lethargic with her arms and legs shaking and due to prior history of falls, brought her to the ED for IV antibiotics and further evaluation.   Patient also has shortness of breath which has progressively worsened. Not always compliant with her CPAP. Also has extensive cardiac history, and her based on her recent CardioMems readings her Torsemide was increased from 2 to 3 tabs a day.  Denies fevers, chills, headache, chest pain, palpitations, cough, nausea, vomiting, diarrhea, hematuria, dark stools, focal neurologic symptoms.   In the ED noted to have elevated Trops, Leukocytosis, VBGs showing CO2 retention, UA was positive, CT showing 3.5 cm right adnexal mass. History obtained from patient at bedside and , Kwadwo Alfred via the phone:    73F with PMHx Chronic lower back pain secondary to Spinal stenosis and Sciatica, Neuropathy, Recurrent UTIs, DM, CAD s/p CABGx4 (2019) and CardioMEMS, ROMANA on CPAP, Hypothyroidism, Carpal tunnel syndrome presents to the ED with complaint of flank pain and dysuria. According to the , 5 days back patient started having increased frequency of urination associated with foul odor, incontinence and "slimy" discharge. Also had L flank pain which is radiating to the LLQ with patient rates pain as 9/10. Due to her prior history of recurrent UTIs, patient was prescribed oral antibiotics which she did not take due to nausea.  noticed that patient over the last few days became progressively more lethargic with her arms and legs shaking and due to prior history of falls, brought her to the ED for IV antibiotics and further evaluation. Patient also has shortness of breath which has progressively worsened. Not always compliant with her CPAP. Also has extensive cardiac history, and her based on her recent CardioMems readings her Torsemide was increased from 2 to 3 tabs a day.  Denies fevers, chills, headache, chest pain, palpitations, cough, nausea, vomiting, diarrhea, hematuria, dark stools, focal neurologic symptoms. In the ED noted to have elevated Trops, Leukocytosis, VBGs showing CO2 retention, UA was positive, CT showing 3.5 cm right adnexal mass.

## 2024-04-05 NOTE — ED ADULT NURSE REASSESSMENT NOTE - NS ED NURSE REASSESS COMMENT FT1
Pt placed on CCM in critical care. Report given to TANISHA WEI. Pt transferred from [facility] to [facility]. RN aware of transfer. Belongings sheet completed and signed, and placed in chart. CCM in place. Report given to TANISHA ROJAS [facility]. RN aware of transfer. Belongings sheet completed and signed, and placed in chart.

## 2024-04-05 NOTE — H&P ADULT - NSHPSOURCEINFORD_GEN_ALL_CORE
Called pt and confirmed Dr Garcia Memory ordered CT without contrast Chart(s)/Patient/Spouse/Significant Other/Physician/Provider

## 2024-04-06 LAB
A1C WITH ESTIMATED AVERAGE GLUCOSE RESULT: 8.3 % — HIGH (ref 4–5.6)
ALBUMIN SERPL ELPH-MCNC: 3.2 G/DL — LOW (ref 3.3–5.2)
ALP SERPL-CCNC: 123 U/L — HIGH (ref 40–120)
ALT FLD-CCNC: 8 U/L — SIGNIFICANT CHANGE UP
ANION GAP SERPL CALC-SCNC: 6 MMOL/L — SIGNIFICANT CHANGE UP (ref 5–17)
AST SERPL-CCNC: 9 U/L — SIGNIFICANT CHANGE UP
BILIRUB SERPL-MCNC: 0.3 MG/DL — LOW (ref 0.4–2)
BUN SERPL-MCNC: 25.6 MG/DL — HIGH (ref 8–20)
CALCIUM SERPL-MCNC: 8.8 MG/DL — SIGNIFICANT CHANGE UP (ref 8.4–10.5)
CHLORIDE SERPL-SCNC: 103 MMOL/L — SIGNIFICANT CHANGE UP (ref 96–108)
CO2 SERPL-SCNC: 33 MMOL/L — HIGH (ref 22–29)
CREAT SERPL-MCNC: 1.15 MG/DL — SIGNIFICANT CHANGE UP (ref 0.5–1.3)
EGFR: 50 ML/MIN/1.73M2 — LOW
ESTIMATED AVERAGE GLUCOSE: 192 MG/DL — HIGH (ref 68–114)
GAS PNL BLDA: SIGNIFICANT CHANGE UP
GLUCOSE BLDC GLUCOMTR-MCNC: 122 MG/DL — HIGH (ref 70–99)
GLUCOSE BLDC GLUCOMTR-MCNC: 140 MG/DL — HIGH (ref 70–99)
GLUCOSE BLDC GLUCOMTR-MCNC: 187 MG/DL — HIGH (ref 70–99)
GLUCOSE BLDC GLUCOMTR-MCNC: 226 MG/DL — HIGH (ref 70–99)
GLUCOSE SERPL-MCNC: 115 MG/DL — HIGH (ref 70–99)
HCT VFR BLD CALC: 38.5 % — SIGNIFICANT CHANGE UP (ref 34.5–45)
HCV AB S/CO SERPL IA: 0.12 S/CO — SIGNIFICANT CHANGE UP (ref 0–0.99)
HCV AB SERPL-IMP: SIGNIFICANT CHANGE UP
HGB BLD-MCNC: 11.3 G/DL — LOW (ref 11.5–15.5)
MCHC RBC-ENTMCNC: 28.5 PG — SIGNIFICANT CHANGE UP (ref 27–34)
MCHC RBC-ENTMCNC: 29.4 GM/DL — LOW (ref 32–36)
MCV RBC AUTO: 97.2 FL — SIGNIFICANT CHANGE UP (ref 80–100)
PLATELET # BLD AUTO: 219 K/UL — SIGNIFICANT CHANGE UP (ref 150–400)
POTASSIUM SERPL-MCNC: 5.6 MMOL/L — HIGH (ref 3.5–5.3)
POTASSIUM SERPL-SCNC: 5.6 MMOL/L — HIGH (ref 3.5–5.3)
PROT SERPL-MCNC: 6.6 G/DL — SIGNIFICANT CHANGE UP (ref 6.6–8.7)
RBC # BLD: 3.96 M/UL — SIGNIFICANT CHANGE UP (ref 3.8–5.2)
RBC # FLD: 15.9 % — HIGH (ref 10.3–14.5)
SODIUM SERPL-SCNC: 142 MMOL/L — SIGNIFICANT CHANGE UP (ref 135–145)
TROPONIN T, HIGH SENSITIVITY RESULT: 48 NG/L — SIGNIFICANT CHANGE UP (ref 0–51)
WBC # BLD: 11.35 K/UL — HIGH (ref 3.8–10.5)
WBC # FLD AUTO: 11.35 K/UL — HIGH (ref 3.8–10.5)

## 2024-04-06 PROCEDURE — 99232 SBSQ HOSP IP/OBS MODERATE 35: CPT

## 2024-04-06 RX ORDER — TRAZODONE HCL 50 MG
50 TABLET ORAL DAILY
Refills: 0 | Status: DISCONTINUED | OUTPATIENT
Start: 2024-04-06 | End: 2024-04-11

## 2024-04-06 RX ORDER — ATORVASTATIN CALCIUM 80 MG/1
80 TABLET, FILM COATED ORAL AT BEDTIME
Refills: 0 | Status: DISCONTINUED | OUTPATIENT
Start: 2024-04-06 | End: 2024-04-11

## 2024-04-06 RX ORDER — LEVOTHYROXINE SODIUM 125 MCG
100 TABLET ORAL DAILY
Refills: 0 | Status: DISCONTINUED | OUTPATIENT
Start: 2024-04-06 | End: 2024-04-11

## 2024-04-06 RX ORDER — DEXTROSE 50 % IN WATER 50 %
12.5 SYRINGE (ML) INTRAVENOUS ONCE
Refills: 0 | Status: DISCONTINUED | OUTPATIENT
Start: 2024-04-06 | End: 2024-04-11

## 2024-04-06 RX ORDER — DEXTROSE 50 % IN WATER 50 %
15 SYRINGE (ML) INTRAVENOUS ONCE
Refills: 0 | Status: DISCONTINUED | OUTPATIENT
Start: 2024-04-06 | End: 2024-04-11

## 2024-04-06 RX ORDER — SODIUM CHLORIDE 9 MG/ML
1000 INJECTION, SOLUTION INTRAVENOUS
Refills: 0 | Status: DISCONTINUED | OUTPATIENT
Start: 2024-04-06 | End: 2024-04-11

## 2024-04-06 RX ORDER — GABAPENTIN 400 MG/1
100 CAPSULE ORAL
Refills: 0 | Status: DISCONTINUED | OUTPATIENT
Start: 2024-04-06 | End: 2024-04-11

## 2024-04-06 RX ORDER — METOPROLOL TARTRATE 50 MG
25 TABLET ORAL DAILY
Refills: 0 | Status: DISCONTINUED | OUTPATIENT
Start: 2024-04-06 | End: 2024-04-06

## 2024-04-06 RX ORDER — ALPRAZOLAM 0.25 MG
0.25 TABLET ORAL DAILY
Refills: 0 | Status: DISCONTINUED | OUTPATIENT
Start: 2024-04-06 | End: 2024-04-11

## 2024-04-06 RX ORDER — MONTELUKAST 4 MG/1
10 TABLET, CHEWABLE ORAL DAILY
Refills: 0 | Status: DISCONTINUED | OUTPATIENT
Start: 2024-04-06 | End: 2024-04-11

## 2024-04-06 RX ORDER — NYSTATIN CREAM 100000 [USP'U]/G
1 CREAM TOPICAL
Refills: 0 | Status: DISCONTINUED | OUTPATIENT
Start: 2024-04-06 | End: 2024-04-11

## 2024-04-06 RX ORDER — INSULIN GLARGINE 100 [IU]/ML
20 INJECTION, SOLUTION SUBCUTANEOUS EVERY MORNING
Refills: 0 | Status: DISCONTINUED | OUTPATIENT
Start: 2024-04-06 | End: 2024-04-11

## 2024-04-06 RX ORDER — FOLIC ACID 0.8 MG
1 TABLET ORAL DAILY
Refills: 0 | Status: DISCONTINUED | OUTPATIENT
Start: 2024-04-06 | End: 2024-04-11

## 2024-04-06 RX ORDER — BUDESONIDE AND FORMOTEROL FUMARATE DIHYDRATE 160; 4.5 UG/1; UG/1
2 AEROSOL RESPIRATORY (INHALATION)
Refills: 0 | Status: DISCONTINUED | OUTPATIENT
Start: 2024-04-06 | End: 2024-04-11

## 2024-04-06 RX ORDER — INSULIN LISPRO 100/ML
VIAL (ML) SUBCUTANEOUS
Refills: 0 | Status: DISCONTINUED | OUTPATIENT
Start: 2024-04-06 | End: 2024-04-11

## 2024-04-06 RX ORDER — GLUCAGON INJECTION, SOLUTION 0.5 MG/.1ML
1 INJECTION, SOLUTION SUBCUTANEOUS ONCE
Refills: 0 | Status: DISCONTINUED | OUTPATIENT
Start: 2024-04-06 | End: 2024-04-11

## 2024-04-06 RX ORDER — SPIRONOLACTONE 25 MG/1
25 TABLET, FILM COATED ORAL DAILY
Refills: 0 | Status: DISCONTINUED | OUTPATIENT
Start: 2024-04-06 | End: 2024-04-06

## 2024-04-06 RX ORDER — INSULIN LISPRO 100/ML
VIAL (ML) SUBCUTANEOUS AT BEDTIME
Refills: 0 | Status: DISCONTINUED | OUTPATIENT
Start: 2024-04-06 | End: 2024-04-11

## 2024-04-06 RX ORDER — OXYCODONE HYDROCHLORIDE 5 MG/1
5 TABLET ORAL EVERY 6 HOURS
Refills: 0 | Status: DISCONTINUED | OUTPATIENT
Start: 2024-04-06 | End: 2024-04-06

## 2024-04-06 RX ORDER — PANTOPRAZOLE SODIUM 20 MG/1
40 TABLET, DELAYED RELEASE ORAL
Refills: 0 | Status: DISCONTINUED | OUTPATIENT
Start: 2024-04-06 | End: 2024-04-11

## 2024-04-06 RX ORDER — DEXTROSE 10 % IN WATER 10 %
125 INTRAVENOUS SOLUTION INTRAVENOUS ONCE
Refills: 0 | Status: DISCONTINUED | OUTPATIENT
Start: 2024-04-06 | End: 2024-04-11

## 2024-04-06 RX ORDER — DEXTROSE 50 % IN WATER 50 %
25 SYRINGE (ML) INTRAVENOUS ONCE
Refills: 0 | Status: DISCONTINUED | OUTPATIENT
Start: 2024-04-06 | End: 2024-04-11

## 2024-04-06 RX ORDER — METOPROLOL TARTRATE 50 MG
25 TABLET ORAL DAILY
Refills: 0 | Status: DISCONTINUED | OUTPATIENT
Start: 2024-04-06 | End: 2024-04-11

## 2024-04-06 RX ORDER — GABAPENTIN 400 MG/1
300 CAPSULE ORAL THREE TIMES A DAY
Refills: 0 | Status: DISCONTINUED | OUTPATIENT
Start: 2024-04-06 | End: 2024-04-06

## 2024-04-06 RX ADMIN — Medication 1 MILLIGRAM(S): at 11:44

## 2024-04-06 RX ADMIN — CEFTRIAXONE 1000 MILLIGRAM(S): 500 INJECTION, POWDER, FOR SOLUTION INTRAMUSCULAR; INTRAVENOUS at 11:44

## 2024-04-06 RX ADMIN — Medication 4: at 17:49

## 2024-04-06 RX ADMIN — GABAPENTIN 300 MILLIGRAM(S): 400 CAPSULE ORAL at 14:12

## 2024-04-06 RX ADMIN — NYSTATIN CREAM 1 APPLICATION(S): 100000 CREAM TOPICAL at 06:47

## 2024-04-06 RX ADMIN — INSULIN GLARGINE 20 UNIT(S): 100 INJECTION, SOLUTION SUBCUTANEOUS at 09:07

## 2024-04-06 RX ADMIN — NYSTATIN CREAM 1 APPLICATION(S): 100000 CREAM TOPICAL at 17:49

## 2024-04-06 RX ADMIN — ENOXAPARIN SODIUM 40 MILLIGRAM(S): 100 INJECTION SUBCUTANEOUS at 21:54

## 2024-04-06 RX ADMIN — Medication 100 MICROGRAM(S): at 06:40

## 2024-04-06 RX ADMIN — GABAPENTIN 300 MILLIGRAM(S): 400 CAPSULE ORAL at 06:40

## 2024-04-06 RX ADMIN — MONTELUKAST 10 MILLIGRAM(S): 4 TABLET, CHEWABLE ORAL at 11:44

## 2024-04-06 RX ADMIN — ATORVASTATIN CALCIUM 80 MILLIGRAM(S): 80 TABLET, FILM COATED ORAL at 21:55

## 2024-04-06 RX ADMIN — GABAPENTIN 100 MILLIGRAM(S): 400 CAPSULE ORAL at 21:55

## 2024-04-06 RX ADMIN — Medication 25 MILLIGRAM(S): at 06:40

## 2024-04-06 RX ADMIN — ENOXAPARIN SODIUM 40 MILLIGRAM(S): 100 INJECTION SUBCUTANEOUS at 00:12

## 2024-04-06 RX ADMIN — Medication 20 MILLIGRAM(S): at 06:40

## 2024-04-06 RX ADMIN — Medication 20 MILLIGRAM(S): at 14:12

## 2024-04-06 RX ADMIN — PANTOPRAZOLE SODIUM 40 MILLIGRAM(S): 20 TABLET, DELAYED RELEASE ORAL at 06:45

## 2024-04-06 NOTE — CHART NOTE - NSCHARTNOTEFT_GEN_A_CORE
attempted abg 3x, unable to obtain arterial sample, only venous. Patient refused more attempts    Didi Clancy RRT

## 2024-04-06 NOTE — PATIENT PROFILE ADULT - FUNCTIONAL ASSESSMENT - DAILY ACTIVITY ASSESSMENT TYPE
of viable male infant, apgars 7/9, [id number], baby skin to skin with mother during stimulation. Cord was clamped at 2 minutes and cut and baby was taken to the warmer for warming and suctioning. When mother was ready, baby was brought over to her and placed back skin to skin. Both bonding well and vitals are stable.          Admission

## 2024-04-06 NOTE — PATIENT PROFILE ADULT - FALL HARM RISK - HARM RISK INTERVENTIONS

## 2024-04-07 ENCOUNTER — RESULT REVIEW (OUTPATIENT)
Age: 74
End: 2024-04-07

## 2024-04-07 LAB
A1C WITH ESTIMATED AVERAGE GLUCOSE RESULT: 8.1 % — HIGH (ref 4–5.6)
ANION GAP SERPL CALC-SCNC: 10 MMOL/L — SIGNIFICANT CHANGE UP (ref 5–17)
BASE EXCESS BLDV CALC-SCNC: 8.2 MMOL/L — HIGH (ref -2–3)
BUN SERPL-MCNC: 27.7 MG/DL — HIGH (ref 8–20)
CA-I SERPL-SCNC: 1.13 MMOL/L — LOW (ref 1.15–1.33)
CALCIUM SERPL-MCNC: 8.7 MG/DL — SIGNIFICANT CHANGE UP (ref 8.4–10.5)
CHLORIDE BLDV-SCNC: 104 MMOL/L — SIGNIFICANT CHANGE UP (ref 96–108)
CHLORIDE SERPL-SCNC: 104 MMOL/L — SIGNIFICANT CHANGE UP (ref 96–108)
CO2 SERPL-SCNC: 31 MMOL/L — HIGH (ref 22–29)
CREAT SERPL-MCNC: 1.22 MG/DL — SIGNIFICANT CHANGE UP (ref 0.5–1.3)
EGFR: 46 ML/MIN/1.73M2 — LOW
ESTIMATED AVERAGE GLUCOSE: 186 MG/DL — HIGH (ref 68–114)
GAS PNL BLDV: 138 MMOL/L — SIGNIFICANT CHANGE UP (ref 136–145)
GAS PNL BLDV: SIGNIFICANT CHANGE UP
GAS PNL BLDV: SIGNIFICANT CHANGE UP
GLUCOSE BLDC GLUCOMTR-MCNC: 128 MG/DL — HIGH (ref 70–99)
GLUCOSE BLDC GLUCOMTR-MCNC: 153 MG/DL — HIGH (ref 70–99)
GLUCOSE BLDC GLUCOMTR-MCNC: 172 MG/DL — HIGH (ref 70–99)
GLUCOSE BLDC GLUCOMTR-MCNC: 222 MG/DL — HIGH (ref 70–99)
GLUCOSE BLDV-MCNC: 135 MG/DL — HIGH (ref 70–99)
GLUCOSE SERPL-MCNC: 135 MG/DL — HIGH (ref 70–99)
HCO3 BLDV-SCNC: 34 MMOL/L — HIGH (ref 22–29)
HCT VFR BLD CALC: 35.2 % — SIGNIFICANT CHANGE UP (ref 34.5–45)
HCT VFR BLDA CALC: 34 % — SIGNIFICANT CHANGE UP
HGB BLD CALC-MCNC: 11.2 G/DL — LOW (ref 11.7–16.1)
HGB BLD-MCNC: 10.5 G/DL — LOW (ref 11.5–15.5)
LACTATE BLDV-MCNC: 0.6 MMOL/L — SIGNIFICANT CHANGE UP (ref 0.5–2)
MAGNESIUM SERPL-MCNC: 2.2 MG/DL — SIGNIFICANT CHANGE UP (ref 1.6–2.6)
MCHC RBC-ENTMCNC: 28.5 PG — SIGNIFICANT CHANGE UP (ref 27–34)
MCHC RBC-ENTMCNC: 29.8 GM/DL — LOW (ref 32–36)
MCV RBC AUTO: 95.7 FL — SIGNIFICANT CHANGE UP (ref 80–100)
PCO2 BLDV: 63 MMHG — HIGH (ref 39–42)
PH BLDV: 7.34 — SIGNIFICANT CHANGE UP (ref 7.32–7.43)
PLATELET # BLD AUTO: 221 K/UL — SIGNIFICANT CHANGE UP (ref 150–400)
PO2 BLDV: 133 MMHG — HIGH (ref 25–45)
POTASSIUM BLDV-SCNC: 4.9 MMOL/L — SIGNIFICANT CHANGE UP (ref 3.5–5.1)
POTASSIUM SERPL-MCNC: 4.6 MMOL/L — SIGNIFICANT CHANGE UP (ref 3.5–5.3)
POTASSIUM SERPL-SCNC: 4.6 MMOL/L — SIGNIFICANT CHANGE UP (ref 3.5–5.3)
RBC # BLD: 3.68 M/UL — LOW (ref 3.8–5.2)
RBC # FLD: 15.7 % — HIGH (ref 10.3–14.5)
SAO2 % BLDV: 98.7 % — SIGNIFICANT CHANGE UP
SODIUM SERPL-SCNC: 144 MMOL/L — SIGNIFICANT CHANGE UP (ref 135–145)
WBC # BLD: 9.85 K/UL — SIGNIFICANT CHANGE UP (ref 3.8–10.5)
WBC # FLD AUTO: 9.85 K/UL — SIGNIFICANT CHANGE UP (ref 3.8–10.5)

## 2024-04-07 PROCEDURE — 76856 US EXAM PELVIC COMPLETE: CPT | Mod: 26

## 2024-04-07 PROCEDURE — 93306 TTE W/DOPPLER COMPLETE: CPT | Mod: 26

## 2024-04-07 PROCEDURE — 99233 SBSQ HOSP IP/OBS HIGH 50: CPT

## 2024-04-07 RX ORDER — FUROSEMIDE 40 MG
40 TABLET ORAL DAILY
Refills: 0 | Status: DISCONTINUED | OUTPATIENT
Start: 2024-04-07 | End: 2024-04-08

## 2024-04-07 RX ORDER — VANCOMYCIN HCL 1 G
1500 VIAL (EA) INTRAVENOUS EVERY 24 HOURS
Refills: 0 | Status: DISCONTINUED | OUTPATIENT
Start: 2024-04-07 | End: 2024-04-08

## 2024-04-07 RX ORDER — CEFTRIAXONE 500 MG/1
1000 INJECTION, POWDER, FOR SOLUTION INTRAMUSCULAR; INTRAVENOUS EVERY 24 HOURS
Refills: 0 | Status: DISCONTINUED | OUTPATIENT
Start: 2024-04-08 | End: 2024-04-11

## 2024-04-07 RX ADMIN — NYSTATIN CREAM 1 APPLICATION(S): 100000 CREAM TOPICAL at 18:28

## 2024-04-07 RX ADMIN — GABAPENTIN 100 MILLIGRAM(S): 400 CAPSULE ORAL at 18:18

## 2024-04-07 RX ADMIN — Medication 300 MILLIGRAM(S): at 18:35

## 2024-04-07 RX ADMIN — INSULIN GLARGINE 20 UNIT(S): 100 INJECTION, SOLUTION SUBCUTANEOUS at 08:56

## 2024-04-07 RX ADMIN — Medication 20 MILLIGRAM(S): at 05:30

## 2024-04-07 RX ADMIN — ATORVASTATIN CALCIUM 80 MILLIGRAM(S): 80 TABLET, FILM COATED ORAL at 22:12

## 2024-04-07 RX ADMIN — GABAPENTIN 100 MILLIGRAM(S): 400 CAPSULE ORAL at 05:30

## 2024-04-07 RX ADMIN — BUDESONIDE AND FORMOTEROL FUMARATE DIHYDRATE 2 PUFF(S): 160; 4.5 AEROSOL RESPIRATORY (INHALATION) at 09:01

## 2024-04-07 RX ADMIN — NYSTATIN CREAM 1 APPLICATION(S): 100000 CREAM TOPICAL at 05:31

## 2024-04-07 RX ADMIN — Medication 20 MILLIGRAM(S): at 15:05

## 2024-04-07 RX ADMIN — Medication 40 MILLIGRAM(S): at 18:19

## 2024-04-07 RX ADMIN — Medication 100 MICROGRAM(S): at 05:30

## 2024-04-07 RX ADMIN — BUDESONIDE AND FORMOTEROL FUMARATE DIHYDRATE 2 PUFF(S): 160; 4.5 AEROSOL RESPIRATORY (INHALATION) at 22:33

## 2024-04-07 RX ADMIN — PANTOPRAZOLE SODIUM 40 MILLIGRAM(S): 20 TABLET, DELAYED RELEASE ORAL at 05:30

## 2024-04-07 RX ADMIN — Medication 2: at 13:30

## 2024-04-07 RX ADMIN — CEFTRIAXONE 1000 MILLIGRAM(S): 500 INJECTION, POWDER, FOR SOLUTION INTRAMUSCULAR; INTRAVENOUS at 09:00

## 2024-04-07 RX ADMIN — ENOXAPARIN SODIUM 40 MILLIGRAM(S): 100 INJECTION SUBCUTANEOUS at 22:12

## 2024-04-07 RX ADMIN — Medication 25 MILLIGRAM(S): at 05:30

## 2024-04-07 RX ADMIN — Medication 2: at 17:00

## 2024-04-07 RX ADMIN — MONTELUKAST 10 MILLIGRAM(S): 4 TABLET, CHEWABLE ORAL at 15:05

## 2024-04-07 RX ADMIN — Medication 1 MILLIGRAM(S): at 08:54

## 2024-04-08 LAB
-  AMOXICILLIN/CLAVULANIC ACID: SIGNIFICANT CHANGE UP
-  AMPICILLIN/SULBACTAM: SIGNIFICANT CHANGE UP
-  AMPICILLIN: SIGNIFICANT CHANGE UP
-  AZTREONAM: SIGNIFICANT CHANGE UP
-  CEFAZOLIN: SIGNIFICANT CHANGE UP
-  CEFEPIME: SIGNIFICANT CHANGE UP
-  CEFOXITIN: SIGNIFICANT CHANGE UP
-  CEFTRIAXONE: SIGNIFICANT CHANGE UP
-  CEFUROXIME: SIGNIFICANT CHANGE UP
-  CIPROFLOXACIN: SIGNIFICANT CHANGE UP
-  ERTAPENEM: SIGNIFICANT CHANGE UP
-  GENTAMICIN: SIGNIFICANT CHANGE UP
-  LEVOFLOXACIN: SIGNIFICANT CHANGE UP
-  MEROPENEM: SIGNIFICANT CHANGE UP
-  NITROFURANTOIN: SIGNIFICANT CHANGE UP
-  PIPERACILLIN/TAZOBACTAM: SIGNIFICANT CHANGE UP
-  TOBRAMYCIN: SIGNIFICANT CHANGE UP
-  TRIMETHOPRIM/SULFAMETHOXAZOLE: SIGNIFICANT CHANGE UP
ANION GAP SERPL CALC-SCNC: 11 MMOL/L — SIGNIFICANT CHANGE UP (ref 5–17)
BUN SERPL-MCNC: 34.9 MG/DL — HIGH (ref 8–20)
CALCIUM SERPL-MCNC: 8.7 MG/DL — SIGNIFICANT CHANGE UP (ref 8.4–10.5)
CHLORIDE SERPL-SCNC: 98 MMOL/L — SIGNIFICANT CHANGE UP (ref 96–108)
CO2 SERPL-SCNC: 33 MMOL/L — HIGH (ref 22–29)
CREAT SERPL-MCNC: 1.24 MG/DL — SIGNIFICANT CHANGE UP (ref 0.5–1.3)
CULTURE RESULTS: ABNORMAL
EGFR: 45 ML/MIN/1.73M2 — LOW
GLUCOSE BLDC GLUCOMTR-MCNC: 122 MG/DL — HIGH (ref 70–99)
GLUCOSE BLDC GLUCOMTR-MCNC: 155 MG/DL — HIGH (ref 70–99)
GLUCOSE BLDC GLUCOMTR-MCNC: 208 MG/DL — HIGH (ref 70–99)
GLUCOSE BLDC GLUCOMTR-MCNC: 259 MG/DL — HIGH (ref 70–99)
GLUCOSE SERPL-MCNC: 235 MG/DL — HIGH (ref 70–99)
HCT VFR BLD CALC: 37.4 % — SIGNIFICANT CHANGE UP (ref 34.5–45)
HGB BLD-MCNC: 11.5 G/DL — SIGNIFICANT CHANGE UP (ref 11.5–15.5)
MAGNESIUM SERPL-MCNC: 1.9 MG/DL — SIGNIFICANT CHANGE UP (ref 1.6–2.6)
MCHC RBC-ENTMCNC: 28.9 PG — SIGNIFICANT CHANGE UP (ref 27–34)
MCHC RBC-ENTMCNC: 30.7 GM/DL — LOW (ref 32–36)
MCV RBC AUTO: 94 FL — SIGNIFICANT CHANGE UP (ref 80–100)
METHOD TYPE: SIGNIFICANT CHANGE UP
ORGANISM # SPEC MICROSCOPIC CNT: ABNORMAL
ORGANISM # SPEC MICROSCOPIC CNT: SIGNIFICANT CHANGE UP
PLATELET # BLD AUTO: 221 K/UL — SIGNIFICANT CHANGE UP (ref 150–400)
POTASSIUM SERPL-MCNC: 4.5 MMOL/L — SIGNIFICANT CHANGE UP (ref 3.5–5.3)
POTASSIUM SERPL-SCNC: 4.5 MMOL/L — SIGNIFICANT CHANGE UP (ref 3.5–5.3)
RBC # BLD: 3.98 M/UL — SIGNIFICANT CHANGE UP (ref 3.8–5.2)
RBC # FLD: 15.6 % — HIGH (ref 10.3–14.5)
SODIUM SERPL-SCNC: 142 MMOL/L — SIGNIFICANT CHANGE UP (ref 135–145)
SPECIMEN SOURCE: SIGNIFICANT CHANGE UP
WBC # BLD: 10.05 K/UL — SIGNIFICANT CHANGE UP (ref 3.8–10.5)
WBC # FLD AUTO: 10.05 K/UL — SIGNIFICANT CHANGE UP (ref 3.8–10.5)

## 2024-04-08 PROCEDURE — 99223 1ST HOSP IP/OBS HIGH 75: CPT

## 2024-04-08 PROCEDURE — 99232 SBSQ HOSP IP/OBS MODERATE 35: CPT

## 2024-04-08 RX ADMIN — ENOXAPARIN SODIUM 40 MILLIGRAM(S): 100 INJECTION SUBCUTANEOUS at 22:09

## 2024-04-08 RX ADMIN — GABAPENTIN 100 MILLIGRAM(S): 400 CAPSULE ORAL at 17:17

## 2024-04-08 RX ADMIN — Medication 40 MILLIGRAM(S): at 05:31

## 2024-04-08 RX ADMIN — Medication 1 MILLIGRAM(S): at 08:56

## 2024-04-08 RX ADMIN — INSULIN GLARGINE 20 UNIT(S): 100 INJECTION, SOLUTION SUBCUTANEOUS at 08:56

## 2024-04-08 RX ADMIN — Medication 0.25 MILLIGRAM(S): at 02:09

## 2024-04-08 RX ADMIN — PANTOPRAZOLE SODIUM 40 MILLIGRAM(S): 20 TABLET, DELAYED RELEASE ORAL at 05:29

## 2024-04-08 RX ADMIN — ATORVASTATIN CALCIUM 80 MILLIGRAM(S): 80 TABLET, FILM COATED ORAL at 22:09

## 2024-04-08 RX ADMIN — MONTELUKAST 10 MILLIGRAM(S): 4 TABLET, CHEWABLE ORAL at 08:56

## 2024-04-08 RX ADMIN — CEFTRIAXONE 1000 MILLIGRAM(S): 500 INJECTION, POWDER, FOR SOLUTION INTRAMUSCULAR; INTRAVENOUS at 08:56

## 2024-04-08 RX ADMIN — Medication 25 MILLIGRAM(S): at 05:30

## 2024-04-08 RX ADMIN — NYSTATIN CREAM 1 APPLICATION(S): 100000 CREAM TOPICAL at 05:31

## 2024-04-08 RX ADMIN — Medication 4: at 08:57

## 2024-04-08 RX ADMIN — Medication 100 MICROGRAM(S): at 05:29

## 2024-04-08 RX ADMIN — GABAPENTIN 100 MILLIGRAM(S): 400 CAPSULE ORAL at 05:30

## 2024-04-08 RX ADMIN — Medication 6: at 17:17

## 2024-04-08 RX ADMIN — NYSTATIN CREAM 1 APPLICATION(S): 100000 CREAM TOPICAL at 17:19

## 2024-04-08 NOTE — DIETITIAN INITIAL EVALUATION ADULT - PERTINENT LABORATORY DATA
04-08    142  |  98  |  34.9<H>  ----------------------------<  235<H>  4.5   |  33.0<H>  |  1.24    Ca    8.7      08 Apr 2024 05:05  Mg     1.9     04-08    POCT Blood Glucose.: 208 mg/dL (04-08-24 @ 08:42)  A1C with Estimated Average Glucose Result: 8.1 % (04-07-24 @ 05:26)  A1C with Estimated Average Glucose Result: 8.3 % (04-06-24 @ 01:12)

## 2024-04-08 NOTE — DIETITIAN INITIAL EVALUATION ADULT - OTHER INFO
73F with PMHx Chronic lower back pain secondary to Spinal stenosis and Sciatica, Neuropathy, Recurrent UTIs, DM, HFpEF, CAD s/p CABGx4 (2019) and CardioMEMS, ROMANA on CPAP, Hypothyroidism, Carpal tunnel syndrome presents to the ED with complaint of L flank pain and dysuria. Associated shortness of breath. CT showing elevated Trops, positive UA, CT scan with right adnexal mass.   Recurrent UTI

## 2024-04-08 NOTE — DIETITIAN INITIAL EVALUATION ADULT - ORAL INTAKE PTA/DIET HISTORY
Pt endorses tolerating meals with good PO intake. Per pt and , pt is noncompliant with diet at home, but  usually covers her intake with insulin. Aix 8.3 noted. Pt not interested in diet education. Approximate 10lb intentional weight loss noted.  HBV protein foods encouraged for wound healing.

## 2024-04-08 NOTE — CONSULT NOTE ADULT - SUBJECTIVE AND OBJECTIVE BOX
[facility]  INFECTIOUS DISEASES at [place] / [place] / [place]  =======================================================                               Eddie Linda MD#   Radha Walker MD*                             Liliana Cruz MD*   Shoshana Richardson MD*                              Professor Emeritus:  Dr David Krishna MD^            Diplomates American Board of Internal Medicine & Infectious Diseases                # [place] Office - Appt - Tel  [phone] Fax [phone]                * [place] Office - Appt - Tel [phone] Fax [phone]               ^ [place] Office - Appt - Tel  [phone] Fax [phone]                                  Hospital Consult line:  [phone]  =======================================================      N-[id number]  BLESSINGARMANDO ANDRADE    CC: Patient is a 73y old  Female who presents with a chief complaint of Urinary tract infection     (08 Apr 2024 11:18)      73y  Female  with h/o Chronic lower back pain secondary to Spinal stenosis and Sciatica, Neuropathy, Recurrent UTIs, DM, CAD s/p CABGx4 (2019) and CardioMEMS, ROMANA on CPAP, Hypothyroidism, Carpal tunnel syndrome. Patient presents to the ED with complaint of dysuria. Patient frequently has dysuria and urinary frequency and is treated by her PMD for UTI, which she takes at times and doesn't at times she has nausea. Her PMD on last visit recommended her go to the ER since the symptoms did not resolve. In the ED, patient was afebrile,  leukocytosis to 13k. Found to have elevated Trops, Leukocytosis,  CT showing 3.5 cm right adnexal mass. Patient was started on Vancomycin and Ceftriaxone. ID input requested 4/8.       Past Medical & Surgical Hx:  Chronic low back pain with sciatica  Spinal stenosis  Diabetes  ROMANA on CPAP  CAD (coronary artery disease)  Recurrent UTI (urinary tract infection)  Hypothyroidism  Neuropathy  Tremor of right hand  H/O carpal tunnel repair  S/P CABG x 4  S/P biliary surgery      Social Hx:  Denies smoking       FAMILY HISTORY:  Patient does not recall FH of medical problems of parents       Allergies  penicillin (Unknown)  Dilaudid (Nausea)  Kiwi (Unknown)       REVIEW OF SYSTEMS:  CONSTITUTIONAL:  No Fever or chills  HEENT:  No diplopia or blurred vision.  No earache, sore throat or runny nose.  CARDIOVASCULAR:  No chest pain  RESPIRATORY:  No cough, shortness of breath  GASTROINTESTINAL:  No nausea, vomiting or diarrhea.  GENITOURINARY:  No dysuria, frequency or urgency. No Blood in urine  MUSCULOSKELETAL:  no joint aches, no muscle pain  SKIN:  No change in skin, hair or nails.  NEUROLOGIC:  No Headaches      Physical Exam:  GEN: NAD  HEENT: normocephalic and atraumatic. EOMI. PERRL.    NECK: Supple.   LUNGS: CTA B/L.  HEART: RRR  ABDOMEN: Soft, NT, ND.  +BS.    : No CVA tenderness  EXTREMITIES: Without  edema.  MSK: No joint swelling  NEUROLOGIC: No Focal Deficits   SKIN: No rash      Vitals:  T(F): 98.1 (08 Apr 2024 08:45), Max: 98.5 (08 Apr 2024 05:01)  HR: 79 (08 Apr 2024 08:45)  BP: 124/70 (08 Apr 2024 08:45)  RR: 18 (08 Apr 2024 08:45)  SpO2: 97% (08 Apr 2024 08:45) (92% - 97%)  temp max in last 48H T(F): , Max: 99.3 (04-06-24 @ 15:35)      Current Antibiotics:  cefTRIAXone Injectable. 1000 milliGRAM(s) IV Push every 24 hours  vancomycin  IVPB 1500 milliGRAM(s) IV Intermittent every 24 hours    Other medications:  atorvastatin 80 milliGRAM(s) Oral at bedtime  budesonide 160 MICROgram(s)/formoterol 4.5 MICROgram(s) Inhaler 2 Puff(s) Inhalation two times a day  dextrose 10% Bolus 125 milliLiter(s) IV Bolus once  dextrose 5%. 1000 milliLiter(s) IV Continuous <Continuous>  dextrose 5%. 1000 milliLiter(s) IV Continuous <Continuous>  dextrose 50% Injectable 12.5 Gram(s) IV Push once  dextrose 50% Injectable 25 Gram(s) IV Push once  enoxaparin Injectable 40 milliGRAM(s) SubCutaneous every 24 hours  folic acid 1 milliGRAM(s) Oral daily  furosemide   Injectable 40 milliGRAM(s) IV Push daily  gabapentin 100 milliGRAM(s) Oral two times a day  glucagon  Injectable 1 milliGRAM(s) IntraMuscular once  insulin glargine Injectable (LANTUS) 20 Unit(s) SubCutaneous every morning  insulin lispro (ADMELOG) corrective regimen sliding scale   SubCutaneous three times a day before meals  insulin lispro (ADMELOG) corrective regimen sliding scale   SubCutaneous at bedtime  levothyroxine 100 MICROGram(s) Oral daily  metoprolol succinate ER 25 milliGRAM(s) Oral daily  montelukast 10 milliGRAM(s) Oral daily  nystatin Powder 1 Application(s) Topical two times a day  pantoprazole    Tablet 40 milliGRAM(s) Oral before breakfast                            11.5   10.05 )-----------( 221      ( 08 Apr 2024 05:05 )             37.4     04-08    142  |  98  |  34.9<H>  ----------------------------<  235<H>  4.5   |  33.0<H>  |  1.24    Ca    8.7      08 Apr 2024 05:05  Mg     1.9     04-08      RECENT CULTURES:  04-05 @ 17:28 Clean Catch Clean Catch (Midstream) Proteus mirabilis    50,000 - 99,000 CFU/mL Proteus mirabilis  50,000 - 99,000 CFU/mL Aerococcus urinae  Isolates of Aerococcus spp. are predictably susceptible to penicillin,  ampicillin, and vancomycin. All isolates are resistant to sulfonamides    -  Amoxicillin/Clavulanic Acid: S <=8/4   -  Ampicillin: S <=8 These ampicillin results predict results for amoxicillin   -  Ampicillin/Sulbactam: S <=4/2   -  Aztreonam: S <=4   -  Cefazolin: S <=2 For uncomplicated UTI with K. pneumoniae, E. coli, or P. mirablis: NIRAV <=16 is sensitive and NIRAV >=32 is resistant. This also predicts results for oral agents cefaclor, cefdinir, cefpodoxime, cefprozil, cefuroxime axetil, cephalexin and locarbef for uncomplicated UTI. Note that some isolates may be susceptible to these agents while testing resistant to cefazolin.   -  Cefepime: S <=2   -  Cefoxitin: S <=8   -  Ceftriaxone: S <=1   -  Cefuroxime: S <=4   -  Ciprofloxacin: S <=0.25   -  Ertapenem: S <=0.5   -  Gentamicin: S <=2   -  Levofloxacin: S <=0.5   -  Meropenem: S <=1   -  Nitrofurantoin: R >64 Should not be used to treat pyelonephritis   -  Piperacillin/Tazobactam: S <=8   -  Tobramycin: S <=2   -  Trimethoprim/Sulfamethoxazole: S <=0.5/9.5      04-05 @ 12:20 .Blood Blood-Peripheral     No growth at 48 Hours    04-05 @ 12:10 .Blood Blood-Peripheral     No growth at 48 Hours      WBC Count: 10.05 K/uL (04-08-24 @ 05:05)  WBC Count: 9.85 K/uL (04-07-24 @ 05:26)  WBC Count: 11.35 K/uL (04-06-24 @ 01:12)  WBC Count: 13.74 K/uL (04-05-24 @ 12:20)    Creatinine: 1.24 mg/dL (04-08-24 @ 05:05)  Creatinine: 1.22 mg/dL (04-07-24 @ 05:26)  Creatinine: 1.15 mg/dL (04-06-24 @ 01:12)  Creatinine: 1.16 mg/dL (04-05-24 @ 12:20)    SARS-CoV-2 Result: NotDetec (04-05-24 @ 12:20)      Urinalysis (04.05.24 @ 17:28)    Glucose Qualitative, Urine: Negative mg/dL   Blood, Urine: Negative   pH Urine: 8.5   Color: Yellow   Urine Appearance: Turbid   Bilirubin: Negative   Ketone - Urine: Negative mg/dL   Specific Gravity: 1.025   Protein, Urine: 100 mg/dL   Urobilinogen: 1.0 mg/dL   Nitrite: Negative   Leukocyte Esterase Concentration: Large  Urine Microscopic-Add On (NC) (04.05.24 @ 17:28)    Epithelial Cells: >36 /HPF   Cast: 7 /LPF   Red Blood Cell - Urine: 3 /HPF   White Blood Cell - Urine: 440 /HPF   Bacteria: Many /HPF        < from: US Pelvis Complete (US Pelvis Complete .) (04.07.24 @ 12:41) >  ACC: [id number] EXAM:  US PELVIC COMPLETE   ORDERED BY: DWAINE CABRERA     PROCEDURE DATE:  04/07/2024          INTERPRETATION:  CLINICAL INFORMATION: Right adnexal mass.    LMP: Postmenopausal    COMPARISON: None available.    TECHNIQUE:  Transabdominal pelvic sonogram only.    FINDINGS:  Uterus: Incompletely visualized.  8.6 cm x 4.6 cm x 5.6 cm. Within normal   limits.  Endometrium: Incompletely visualized.  Visualized portions are   heterogeneous and abnormally thickened to 2.2 cm    Right ovary: Not visualized  Left ovary: Not visualized    Fluid: None.    IMPRESSION:  The uterus and endometrium are incompletely visualized by transabdominal   sonographic technique.    The visualized portion of the endometrium is heterogeneous and abnormally   thickened to 2.2 cm.  Gynecological consultation and hysteroscopy is   recommended.    Neither the right nor left ovary were visualized by transabdominal   ultrasound.  A right adnexal soft tissue lesion reported on prior CT scan   appears to be arising from the uterus and likely represents a subserosal   fibroid.  By my interpretation, the adjacent right adnexa appears grossly   unremarkable on the CT scan (see key images: Series 5, images 45-50 on CT   of 04/05/2024.    Endovaginal ultrasound and/or MRI evaluation may be obtained as   clinically warranted.        --- End of Report ---    < end of copied text >      < from: CT Abdomen and Pelvis w/ IV Cont (04.05.24 @ 16:11) >  ACC: [id number] EXAM:  CT ABDOMEN AND PELVIS IC   ORDERED BY: HUGO REESE     ACC: [id number] EXAM:  CT ANGIO CHEST PULM ART [facility]   ORDERED BY: HUGO REESE     PROCEDURE DATE:  04/05/2024          INTERPRETATION:  CLINICAL INDICATION: Chest and abdominal pain    TECHNIQUE: CT Angiography of the Chest was performed followed by portal   venous phase imaging of the Abdomen and Pelvis. Sagittal and coronal   reformats were performed as well as 3D (MIP) reconstructions.    CONTRAST/COMPLICATIONS:  IV Contrast: Omnipaque 350 (accession [id number]), IV contrast documented   in unlinked concurrent exam (accession [id number])  90 cc administered   10   cc discarded  Oral Contrast: NONE  Complications: None reported at time of study completion    COMPARISON: CXR 4/5/2024    FINDINGS:  CTA: The study is technically adequate with a good contrast bolus to the   pulmonary arteries. There are no filling defects in the pulmonary artery   or its branches. Dilated main pulmonary artery measuring 3.9 cm.   Cardiomegaly. The RV: LV ratio is less than 1. There is no pericardial   effusion. The great vessels are normal in size. Scattered mediastinal   clips.    Lungs/Airways/Pleura: Scattered areas of subsegmental atelectasis.  The   central airways are patent. No pleural effusion.    Mediastinum/Lymph nodes: No thoracic adenopathy.    ABDOMEN:  Liver: Normal in size and contour  Bile ducts: No intrahepatic biliary dilatation  Gallbladder: Fluid filled  Pancreas: Unremarkable  Spleen: Normal size  Adrenal glands: Unremarkable    Kidneys: Symmetric size and enhancement.  Abdominal aorta: Normal course and caliber with atherosclerotic   calcifications.  Retroperitoneum: No lymphadenopathy  Peritoneum: No free air, fluid collection, or ascites    Bowel:  Normal caliber small bowel and colon    PELVIS:  Urinary bladder: Mild bilateral thickening.  Pelvic and inguinal lymph nodes: Normal size  Reproductive: Fluid and debris within the endometrial canal, an atypical   finding for a patient of this age. There is soft tissue in the right   adnexa (series 3, image 134) measuring 3.5 cm.    Soft tissues:  Unremarkable subcutaneous and muscular soft tissues    Bones: Median sternotomy.    IMPRESSION:  No pulmonary embolism.    Dilated main pulmonary artery measuring 3.9 cm, a nonspecific finding   which can be seen in the setting of pulmonary hypertension.    Possible 3.5 cm right adnexal mass. Additionally, there is fluid and   debris within the endometrial canal which is an atypical finding fora   patient of this age. Recommend pelvic ultrasound for further assessment.   Neoplasm cannot be excluded.    --- End of Report ---    < end of copied text >

## 2024-04-08 NOTE — DIETITIAN INITIAL EVALUATION ADULT - PERTINENT MEDS FT
MEDICATIONS  (STANDING):  atorvastatin 80 milliGRAM(s) Oral at bedtime  budesonide 160 MICROgram(s)/formoterol 4.5 MICROgram(s) Inhaler 2 Puff(s) Inhalation two times a day  cefTRIAXone Injectable. 1000 milliGRAM(s) IV Push every 24 hours  dextrose 10% Bolus 125 milliLiter(s) IV Bolus once  dextrose 5%. 1000 milliLiter(s) (50 mL/Hr) IV Continuous <Continuous>  dextrose 5%. 1000 milliLiter(s) (100 mL/Hr) IV Continuous <Continuous>  dextrose 50% Injectable 12.5 Gram(s) IV Push once  dextrose 50% Injectable 25 Gram(s) IV Push once  enoxaparin Injectable 40 milliGRAM(s) SubCutaneous every 24 hours  folic acid 1 milliGRAM(s) Oral daily  furosemide   Injectable 40 milliGRAM(s) IV Push daily  gabapentin 100 milliGRAM(s) Oral two times a day  glucagon  Injectable 1 milliGRAM(s) IntraMuscular once  insulin glargine Injectable (LANTUS) 20 Unit(s) SubCutaneous every morning  insulin lispro (ADMELOG) corrective regimen sliding scale   SubCutaneous three times a day before meals  insulin lispro (ADMELOG) corrective regimen sliding scale   SubCutaneous at bedtime  levothyroxine 100 MICROGram(s) Oral daily  metoprolol succinate ER 25 milliGRAM(s) Oral daily  montelukast 10 milliGRAM(s) Oral daily  nystatin Powder 1 Application(s) Topical two times a day  pantoprazole    Tablet 40 milliGRAM(s) Oral before breakfast  vancomycin  IVPB 1500 milliGRAM(s) IV Intermittent every 24 hours    MEDICATIONS  (PRN):  acetaminophen     Tablet .. 650 milliGRAM(s) Oral every 6 hours PRN Temp greater or equal to 38C (100.4F), Mild Pain (1 - 3)  ALPRAZolam 0.25 milliGRAM(s) Oral daily PRN for anxiety  aluminum hydroxide/magnesium hydroxide/simethicone Suspension 30 milliLiter(s) Oral every 4 hours PRN Dyspepsia  dextrose Oral Gel 15 Gram(s) Oral once PRN Blood Glucose LESS THAN 70 milliGRAM(s)/deciliter  melatonin 3 milliGRAM(s) Oral at bedtime PRN Insomnia  ondansetron Injectable 4 milliGRAM(s) IV Push every 8 hours PRN Nausea and/or Vomiting  oxycodone    5 mG/acetaminophen 325 mG 2 Tablet(s) Oral every 6 hours PRN Severe Pain (7 - 10)  oxycodone    5 mG/acetaminophen 325 mG 1 Tablet(s) Oral every 6 hours PRN Moderate Pain (4 - 6)  traZODone 50 milliGRAM(s) Oral daily PRN for insomnia

## 2024-04-08 NOTE — CONSULT NOTE ADULT - ASSESSMENT
73y  Female  with h/o Chronic lower back pain secondary to Spinal stenosis and Sciatica, Neuropathy, Recurrent UTIs, DM, CAD s/p CABGx4 (2019) and CardioMEMS, ROMANA on CPAP, Hypothyroidism, Carpal tunnel syndrome. Patient presents to the ED with complaint of dysuria. Patient frequently has dysuria and urinary frequency and is treated by her PMD for UTI, which she takes at times and doesn't at times she has nausea. Her PMD on last visit recommended her go to the ER since the symptoms did not resolve. In the ED, patient was afebrile,  leukocytosis to 13k. Found to have elevated Trops, Leukocytosis,  CT A/P showing  right adnexal mass. Patient was started on Vancomycin and Ceftriaxone.       Proteus mirabilis  and Aerococcus urinae UTI  Leukocytosis       - Blood cultures  4/5 no growth   - Urine cultures  reporting Proteus mirabilis  and Aerococcus urinae   - RVP/COVID 19 PCR 4/5 negative   - UA with some pyuria   - US pelvis reporting endometrium is heterogeneous and abnormally thickened to 2.2 cm  - CT A/P reporting Rt adnexal mass   - Recommend GYN eval   - D/C Vancomycin  - Continue Ceftriaxone till 4/11   - Due to multiple UTI need Uro/GYN consult as outpatient   - Hold off on PICC/Midline for now unless needed for reasons other than infectious diseases  - Follow up cultures  - Trend Fever  - Trend WBC      Will sign off. Please call PRN.      Discussed treatment plan with:  Dr Soto

## 2024-04-08 NOTE — DIETITIAN INITIAL EVALUATION ADULT - NS FNS DIET ORDER
Diet, DASH/TLC:   Sodium & Cholesterol Restricted  Consistent Carbohydrate {No Snacks} (CSTCHO) (04-05-24 @ 23:14)

## 2024-04-09 ENCOUNTER — TRANSCRIPTION ENCOUNTER (OUTPATIENT)
Age: 74
End: 2024-04-09

## 2024-04-09 LAB
ANION GAP SERPL CALC-SCNC: 9 MMOL/L — SIGNIFICANT CHANGE UP (ref 5–17)
BUN SERPL-MCNC: 31.9 MG/DL — HIGH (ref 8–20)
CALCIUM SERPL-MCNC: 8.8 MG/DL — SIGNIFICANT CHANGE UP (ref 8.4–10.5)
CHLORIDE SERPL-SCNC: 100 MMOL/L — SIGNIFICANT CHANGE UP (ref 96–108)
CO2 SERPL-SCNC: 34 MMOL/L — HIGH (ref 22–29)
CREAT SERPL-MCNC: 1.12 MG/DL — SIGNIFICANT CHANGE UP (ref 0.5–1.3)
EGFR: 51 ML/MIN/1.73M2 — LOW
GLUCOSE BLDC GLUCOMTR-MCNC: 105 MG/DL — HIGH (ref 70–99)
GLUCOSE BLDC GLUCOMTR-MCNC: 181 MG/DL — HIGH (ref 70–99)
GLUCOSE BLDC GLUCOMTR-MCNC: 299 MG/DL — HIGH (ref 70–99)
GLUCOSE BLDC GLUCOMTR-MCNC: 304 MG/DL — HIGH (ref 70–99)
GLUCOSE SERPL-MCNC: 104 MG/DL — HIGH (ref 70–99)
HCT VFR BLD CALC: 38.2 % — SIGNIFICANT CHANGE UP (ref 34.5–45)
HGB BLD-MCNC: 11.4 G/DL — LOW (ref 11.5–15.5)
MAGNESIUM SERPL-MCNC: 2 MG/DL — SIGNIFICANT CHANGE UP (ref 1.6–2.6)
MCHC RBC-ENTMCNC: 28.4 PG — SIGNIFICANT CHANGE UP (ref 27–34)
MCHC RBC-ENTMCNC: 29.8 GM/DL — LOW (ref 32–36)
MCV RBC AUTO: 95.3 FL — SIGNIFICANT CHANGE UP (ref 80–100)
PLATELET # BLD AUTO: 226 K/UL — SIGNIFICANT CHANGE UP (ref 150–400)
POTASSIUM SERPL-MCNC: 4 MMOL/L — SIGNIFICANT CHANGE UP (ref 3.5–5.3)
POTASSIUM SERPL-SCNC: 4 MMOL/L — SIGNIFICANT CHANGE UP (ref 3.5–5.3)
RBC # BLD: 4.01 M/UL — SIGNIFICANT CHANGE UP (ref 3.8–5.2)
RBC # FLD: 15.5 % — HIGH (ref 10.3–14.5)
SODIUM SERPL-SCNC: 143 MMOL/L — SIGNIFICANT CHANGE UP (ref 135–145)
WBC # BLD: 9.46 K/UL — SIGNIFICANT CHANGE UP (ref 3.8–10.5)
WBC # FLD AUTO: 9.46 K/UL — SIGNIFICANT CHANGE UP (ref 3.8–10.5)

## 2024-04-09 PROCEDURE — 99233 SBSQ HOSP IP/OBS HIGH 50: CPT

## 2024-04-09 PROCEDURE — 76830 TRANSVAGINAL US NON-OB: CPT | Mod: 26

## 2024-04-09 RX ADMIN — CEFTRIAXONE 1000 MILLIGRAM(S): 500 INJECTION, POWDER, FOR SOLUTION INTRAMUSCULAR; INTRAVENOUS at 08:14

## 2024-04-09 RX ADMIN — NYSTATIN CREAM 1 APPLICATION(S): 100000 CREAM TOPICAL at 17:07

## 2024-04-09 RX ADMIN — PANTOPRAZOLE SODIUM 40 MILLIGRAM(S): 20 TABLET, DELAYED RELEASE ORAL at 06:34

## 2024-04-09 RX ADMIN — GABAPENTIN 100 MILLIGRAM(S): 400 CAPSULE ORAL at 17:07

## 2024-04-09 RX ADMIN — BUDESONIDE AND FORMOTEROL FUMARATE DIHYDRATE 2 PUFF(S): 160; 4.5 AEROSOL RESPIRATORY (INHALATION) at 08:16

## 2024-04-09 RX ADMIN — Medication 0.25 MILLIGRAM(S): at 16:34

## 2024-04-09 RX ADMIN — Medication 1 MILLIGRAM(S): at 08:14

## 2024-04-09 RX ADMIN — Medication 2: at 21:32

## 2024-04-09 RX ADMIN — Medication 2: at 12:25

## 2024-04-09 RX ADMIN — MONTELUKAST 10 MILLIGRAM(S): 4 TABLET, CHEWABLE ORAL at 08:14

## 2024-04-09 RX ADMIN — Medication 25 MILLIGRAM(S): at 06:34

## 2024-04-09 RX ADMIN — NYSTATIN CREAM 1 APPLICATION(S): 100000 CREAM TOPICAL at 07:52

## 2024-04-09 RX ADMIN — ENOXAPARIN SODIUM 40 MILLIGRAM(S): 100 INJECTION SUBCUTANEOUS at 21:32

## 2024-04-09 RX ADMIN — Medication 8: at 16:45

## 2024-04-09 RX ADMIN — BUDESONIDE AND FORMOTEROL FUMARATE DIHYDRATE 2 PUFF(S): 160; 4.5 AEROSOL RESPIRATORY (INHALATION) at 20:52

## 2024-04-09 RX ADMIN — Medication 20 MILLIGRAM(S): at 13:06

## 2024-04-09 RX ADMIN — Medication 20 MILLIGRAM(S): at 06:34

## 2024-04-09 RX ADMIN — ATORVASTATIN CALCIUM 80 MILLIGRAM(S): 80 TABLET, FILM COATED ORAL at 21:32

## 2024-04-09 RX ADMIN — BUDESONIDE AND FORMOTEROL FUMARATE DIHYDRATE 2 PUFF(S): 160; 4.5 AEROSOL RESPIRATORY (INHALATION) at 01:09

## 2024-04-09 RX ADMIN — Medication 100 MICROGRAM(S): at 06:35

## 2024-04-09 RX ADMIN — GABAPENTIN 100 MILLIGRAM(S): 400 CAPSULE ORAL at 06:34

## 2024-04-09 NOTE — CONSULT NOTE ADULT - REASON FOR ADMISSION
Severe Sepsis/UTI/Elevated Troponin c/b Hypercapnic Resp Failure
Severe Sepsis/UTI/Elevated Troponin c/b Hypercapnic Resp Failure

## 2024-04-09 NOTE — CONSULT NOTE ADULT - ATTENDING COMMENTS
findings and images reviewed  agree endometrial biopsy should be offered  will return when patient has decided if she wants to proceed with exam, EMB while inpatient  otherwise non-emergent and patient can opt for outpatient follow-up

## 2024-04-09 NOTE — DISCHARGE NOTE NURSING/CASE MANAGEMENT/SOCIAL WORK - PATIENT PORTAL LINK FT
You can access the FollowMyHealth Patient Portal offered by [facility] by registering at the following website: [email or web address]. By joining [employer]’s FollowMyHealth portal, you will also be able to view your health information using other applications (apps) compatible with our system.

## 2024-04-09 NOTE — CONSULT NOTE ADULT - ASSESSMENT
BLESSING ANDRADE is a 74yo  with LMP many years ago (date unknown) admitted to medicine for UTI sepsis and hypercapnic respiratory failure with incidental finding of thickened endometrial lining on TVUS.   Endometrial lining 24mm. Pt asymptomatic denies vaginal bleeding.   Different etiologies of endometrial thickening discussed with patient including hyperplasia vs malignancy. Pt counselled on need for endometrial sampling, either in patient with bedside EMB vs outpatient in office or OR with sedation/anesthesia (if medicially cleared) and hysteroscopy. Pt would like to time to think about her options, has requested that the gyn team return in the afternoon when her  is present.   Will return to review options when  at bedside.      BLESSING ANDRADE is a 74yo  with LMP many years ago (date unknown) admitted to medicine for UTI sepsis and hypercapnic respiratory failure with incidental finding of thickened endometrial lining on TVUS.   Endometrial lining 24mm. Pt asymptomatic denies vaginal bleeding.   Different etiologies of endometrial thickening discussed with patient including hyperplasia vs malignancy. Pt counselled on options for endometrial sampling, either in patient with bedside EMB vs outpatient in office or OR with sedation/anesthesia (if medicially cleared) and hysteroscopy. Pt would like to time to think about her options, has requested that the gyn team return in the afternoon when her  is present.   Will return to review options when  at bedside.

## 2024-04-09 NOTE — CONSULT NOTE ADULT - SUBJECTIVE AND OBJECTIVE BOX
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    ROS:  Gen: no fatigue  CV: no chest pain  Resp: no SOB, wheezing  Neuro: no vision change, headache  GI: no abdominal pain, diarrhea, constipation  : no dysuria, increased frequency, urge  Gyn: no vaginal bleeding, abnormal discharge  ID: no fevers, chills  Int: no rash  MSK: no weakness    PAST MEDICAL & SURGICAL HISTORY:  Chronic low back pain with sciatica      Spinal stenosis      Diabetes      ROMANA on CPAP      CAD (coronary artery disease)      Recurrent UTI (urinary tract infection)      Hypothyroidism      Neuropathy      Tremor of right hand      H/O carpal tunnel repair      S/P CABG x 4      S/P biliary surgery        ObHx:  GynHx:      Menarchy:           Period:   last Pap:   , no h/o abnormal Paps   denies h/o fibroids, cysts, STIs   not sexually active  FamHx: FAMILY HISTORY:  No pertinent family history in first degree relatives      SocHx:  Allergies    penicillin (Unknown)  Dilaudid (Nausea)  Kiwi (Unknown)    Intolerances      Meds:  acetaminophen     Tablet .. 650 milliGRAM(s) Oral every 6 hours PRN  ALPRAZolam 0.25 milliGRAM(s) Oral daily PRN  aluminum hydroxide/magnesium hydroxide/simethicone Suspension 30 milliLiter(s) Oral every 4 hours PRN  atorvastatin 80 milliGRAM(s) Oral at bedtime  budesonide 160 MICROgram(s)/formoterol 4.5 MICROgram(s) Inhaler 2 Puff(s) Inhalation two times a day  cefTRIAXone Injectable. 1000 milliGRAM(s) IV Push every 24 hours  dextrose 10% Bolus 125 milliLiter(s) IV Bolus once  dextrose 5%. 1000 milliLiter(s) IV Continuous <Continuous>  dextrose 5%. 1000 milliLiter(s) IV Continuous <Continuous>  dextrose 50% Injectable 12.5 Gram(s) IV Push once  dextrose 50% Injectable 25 Gram(s) IV Push once  dextrose Oral Gel 15 Gram(s) Oral once PRN  enoxaparin Injectable 40 milliGRAM(s) SubCutaneous every 24 hours  folic acid 1 milliGRAM(s) Oral daily  gabapentin 100 milliGRAM(s) Oral two times a day  glucagon  Injectable 1 milliGRAM(s) IntraMuscular once  insulin glargine Injectable (LANTUS) 20 Unit(s) SubCutaneous every morning  insulin lispro (ADMELOG) corrective regimen sliding scale   SubCutaneous three times a day before meals  insulin lispro (ADMELOG) corrective regimen sliding scale   SubCutaneous at bedtime  levothyroxine 100 MICROGram(s) Oral daily  melatonin 3 milliGRAM(s) Oral at bedtime PRN  metoprolol succinate ER 25 milliGRAM(s) Oral daily  montelukast 10 milliGRAM(s) Oral daily  nystatin Powder 1 Application(s) Topical two times a day  ondansetron Injectable 4 milliGRAM(s) IV Push every 8 hours PRN  oxycodone    5 mG/acetaminophen 325 mG 1 Tablet(s) Oral every 6 hours PRN  oxycodone    5 mG/acetaminophen 325 mG 2 Tablet(s) Oral every 6 hours PRN  pantoprazole    Tablet 40 milliGRAM(s) Oral before breakfast  torsemide 20 milliGRAM(s) Oral two times a day  traZODone 50 milliGRAM(s) Oral daily PRN  Home Medications:  atorvastatin 80 mg oral tablet: 1 tab(s) orally once a day (at bedtime) (05 Apr 2024 23:50)  betamethasone dipropionate 0.05% topical cream: Apply topically to affected area 2 times a day To affected area (05 Apr 2024 23:49)  budesonide/glycopyrrolate/formoterol 160 mcg-9 mcg-4.8 mcg/inh inhalation aerosol: 2 puff(s) inhaled in the morning and at bedtime (05 Apr 2024 23:51)  Compazine 5 mg oral tablet: 1 tab(s) orally every 8 hours as needed for  nausea (05 Apr 2024 23:50)  diclofenac 1% topical gel: Apply topically to affected area 4 times a day (05 Apr 2024 23:49)  ezetimibe 10 mg oral tablet: 1 tab(s) orally once a day (05 Apr 2024 23:51)  folic acid 1 mg oral tablet: 1 tab(s) orally once a day (05 Apr 2024 23:51)  gabapentin 300 mg oral capsule: 1 cap(s) orally 3 times a day (05 Apr 2024 23:51)  Lantus Solostar Pen 100 units/mL subcutaneous solution: 45 unit(s) subcutaneous once a day (05 Apr 2024 23:51)  levothyroxine 100 mcg (0.1 mg) oral tablet: 1 tab(s) orally once a day (05 Apr 2024 23:51)  metFORMIN 500 mg oral tablet: 1 tab(s) orally 2 times a day (05 Apr 2024 23:51)  metoprolol succinate 25 mg oral tablet, extended release: 1 tab(s) orally once a day (05 Apr 2024 23:51)  omeprazole 40 mg oral delayed release capsule: 1 cap(s) orally once a day (05 Apr 2024 23:51)  oxycodone-acetaminophen 10 mg-325 mg oral tablet: 1 tab(s) orally every 6 hours as needed for  severe pain (05 Apr 2024 23:51)  Singulair 10 mg oral tablet: 1 tab(s) orally once a day (05 Apr 2024 23:51)  spironolactone 25 mg oral tablet: 1 tab(s) orally once a day (05 Apr 2024 23:50)  tiZANidine 2 mg oral tablet: 1 tab(s) orally once a day (at bedtime) as needed for tremor (05 Apr 2024 23:50)  torsemide 20 mg oral tablet: 1 tab(s) orally 3 times a day (05 Apr 2024 23:50)  traZODone 50 mg oral tablet: 1 tab(s) orally once a day as needed for  insomnia (05 Apr 2024 23:50)  Xanax 0.25 mg oral tablet: 1 tab(s) orally once a day as needed for  anxiety (05 Apr 2024 23:49)    Health Maintenance:         T(C): 36.4 (04-09-24 @ 08:38), Max: 36.8 (04-08-24 @ 16:33)  HR: 78 (04-09-24 @ 08:38) (67 - 91)  BP: 138/85 (04-09-24 @ 08:38) (125/70 - 139/77)  RR: 18 (04-09-24 @ 08:38) (18 - 19)  SpO2: 95% (04-09-24 @ 08:38) (95% - 99%)    Physical Exam:  Gen: alert oriented no acute distress  HEENT: atraumatic, normocephalic  CV: Regular rate rhythm  Pulm: clear to auscultation bilaterally  Abd: soft non-tender, no surgical incisions,+ BS  : no CVA tenderness  Gyn:    external genitalia normal in apperance no lesions    on speculum exam no abnormal lesions visualized, no abnormal discharge or bleeding    on bimanual exam small anteverted uterus no adnexal masses/tenderness .  no CMT.  os closed.  Msk: no erythema or swelling      04-09    143  |  100  |  31.9<H>  ----------------------------<  104<H>  4.0   |  34.0<H>  |  1.12    Ca    8.8      09 Apr 2024 06:03  Mg     2.0     04-09        Urinalysis Basic - ( 09 Apr 2024 06:03 )    Color: x / Appearance: x / SG: x / pH: x  Gluc: 104 mg/dL / Ketone: x  / Bili: x / Urobili: x   Blood: x / Protein: x / Nitrite: x   Leuk Esterase: x / RBC: x / WBC x   Sq Epi: x / Non Sq Epi: x / Bacteria: x         BLESSING ANDRADE is a 74yo  with LMP many years ago (date unknown) admitted to medicine for UTI sepsis and hypercapnic respiratory failure with a CT finding of endometrial debris and a questionable right adnexal mass 3cm. GYN consulted due to these abnormalities on imaging. Pt denies any vaginal bleeding, unusual discharge or pelvic pain. She reports a h/o of urinary incontinence.     ROS:  Gen: no fatigue  CV: no chest pain  Resp: no SOB, wheezing  Neuro: no vision change, headache  GI: no abdominal pain, diarrhea, constipation  : no dysuria, increased frequency, urge  Gyn: no vaginal bleeding, abnormal discharge  ID: no fevers, chills  Int: no rash  MSK: no weakness    PAST MEDICAL & SURGICAL HISTORY:  Chronic low back pain with sciatica  Spinal stenosis  Diabetes  ROMANA on CPAP  CAD (coronary artery disease)  Recurrent UTI (urinary tract infection)  Hypothyroidism  Neuropathy  Tremor of right hand  H/O carpal tunnel repair  S/P CABG x 4  S/P biliary surgery  Appendectomy   Adnexal mass resection         ObHx:  Pt reports possible pregnancy in the past that ended in miscarriage- unsure of the details  GynHx:     Cannot recall last gyn visit, very long ago   no h/o abnormal Paps   denies h/o fibroids, STIs, + h/o endometiral polyps and grapefruit sized ovarian mass (resected) (side unknown)    not sexually active for many years     FamHx:   Father- brain CA  Sister- diagnosed with breast CA at 76yrs     SocHx: lives with  who handles all of her care    Allergies/Intolerances:  penicillin (Unknown)  Dilaudid (Nausea)  Kiwi (Unknown)        Meds:  acetaminophen     Tablet .. 650 milliGRAM(s) Oral every 6 hours PRN  ALPRAZolam 0.25 milliGRAM(s) Oral daily PRN  aluminum hydroxide/magnesium hydroxide/simethicone Suspension 30 milliLiter(s) Oral every 4 hours PRN  atorvastatin 80 milliGRAM(s) Oral at bedtime  budesonide 160 MICROgram(s)/formoterol 4.5 MICROgram(s) Inhaler 2 Puff(s) Inhalation two times a day  cefTRIAXone Injectable. 1000 milliGRAM(s) IV Push every 24 hours  dextrose 10% Bolus 125 milliLiter(s) IV Bolus once  dextrose 5%. 1000 milliLiter(s) IV Continuous <Continuous>  dextrose 5%. 1000 milliLiter(s) IV Continuous <Continuous>  dextrose 50% Injectable 12.5 Gram(s) IV Push once  dextrose 50% Injectable 25 Gram(s) IV Push once  dextrose Oral Gel 15 Gram(s) Oral once PRN  enoxaparin Injectable 40 milliGRAM(s) SubCutaneous every 24 hours  folic acid 1 milliGRAM(s) Oral daily  gabapentin 100 milliGRAM(s) Oral two times a day  glucagon  Injectable 1 milliGRAM(s) IntraMuscular once  insulin glargine Injectable (LANTUS) 20 Unit(s) SubCutaneous every morning  insulin lispro (ADMELOG) corrective regimen sliding scale   SubCutaneous three times a day before meals  insulin lispro (ADMELOG) corrective regimen sliding scale   SubCutaneous at bedtime  levothyroxine 100 MICROGram(s) Oral daily  melatonin 3 milliGRAM(s) Oral at bedtime PRN  metoprolol succinate ER 25 milliGRAM(s) Oral daily  montelukast 10 milliGRAM(s) Oral daily  nystatin Powder 1 Application(s) Topical two times a day  ondansetron Injectable 4 milliGRAM(s) IV Push every 8 hours PRN  oxycodone    5 mG/acetaminophen 325 mG 1 Tablet(s) Oral every 6 hours PRN  oxycodone    5 mG/acetaminophen 325 mG 2 Tablet(s) Oral every 6 hours PRN  pantoprazole    Tablet 40 milliGRAM(s) Oral before breakfast  torsemide 20 milliGRAM(s) Oral two times a day  traZODone 50 milliGRAM(s) Oral daily PRN  Home Medications:  atorvastatin 80 mg oral tablet: 1 tab(s) orally once a day (at bedtime) (2024 23:50)  betamethasone dipropionate 0.05% topical cream: Apply topically to affected area 2 times a day To affected area (2024 23:49)  budesonide/glycopyrrolate/formoterol 160 mcg-9 mcg-4.8 mcg/inh inhalation aerosol: 2 puff(s) inhaled in the morning and at bedtime (2024 23:51)  Compazine 5 mg oral tablet: 1 tab(s) orally every 8 hours as needed for  nausea (2024 23:50)  diclofenac 1% topical gel: Apply topically to affected area 4 times a day (2024 23:49)  ezetimibe 10 mg oral tablet: 1 tab(s) orally once a day (2024 23:51)  folic acid 1 mg oral tablet: 1 tab(s) orally once a day (:51)  gabapentin 300 mg oral capsule: 1 cap(s) orally 3 times a day (2024 23:51)  Lantus Solostar Pen 100 units/mL subcutaneous solution: 45 unit(s) subcutaneous once a day (2024 23:51)  levothyroxine 100 mcg (0.1 mg) oral tablet: 1 tab(s) orally once a day (2024 23:51)  metFORMIN 500 mg oral tablet: 1 tab(s) orally 2 times a day (:51)  metoprolol succinate 25 mg oral tablet, extended release: 1 tab(s) orally once a day (:51)  omeprazole 40 mg oral delayed release capsule: 1 cap(s) orally once a day (2024 23:51)  oxycodone-acetaminophen 10 mg-325 mg oral tablet: 1 tab(s) orally every 6 hours as needed for  severe pain (2024 23:51)  Singulair 10 mg oral tablet: 1 tab(s) orally once a day (2024 23:51)  spironolactone 25 mg oral tablet: 1 tab(s) orally once a day (2024 23:50)  tiZANidine 2 mg oral tablet: 1 tab(s) orally once a day (at bedtime) as needed for tremor (2024 23:50)  torsemide 20 mg oral tablet: 1 tab(s) orally 3 times a day (2024 23:50)  traZODone 50 mg oral tablet: 1 tab(s) orally once a day as needed for  insomnia (2024 23:50)  Xanax 0.25 mg oral tablet: 1 tab(s) orally once a day as needed for  anxiety (2024 23:49)      T(C): 36.4 (24 @ 08:38), Max: 36.8 (24 @ 16:33)  HR: 78 (24 @ 08:38) (67 - 91)  BP: 138/85 (24 @ 08:38) (125/70 - 139/77)  RR: 18 (24 @ 08:38) (18 - 19)  SpO2: 95% (24 @ 08:38) (95% - 99%)    Physical Exam:  Gen: alert oriented no acute distress  HEENT: atraumatic, normocephalic  Pulm: normal respiratory effort with nasal canula   Abd: soft non-tender   Gyn:  Deferred for now, pt unsure if she wants exam                               11.4   9.46  )-----------( 226      ( 2024 06:03 )             38.2           143  |  100  |  31.9<H>  ----------------------------<  104<H>  4.0   |  34.0<H>  |  1.12    Ca    8.8      2024 06:03  Mg     2.0     04-        Urinalysis Basic - ( 2024 06:03 )    Color: x / Appearance: x / SG: x / pH: x  Gluc: 104 mg/dL / Ketone: x  / Bili: x / Urobili: x   Blood: x / Protein: x / Nitrite: x   Leuk Esterase: x / RBC: x / WBC x   Sq Epi: x / Non Sq Epi: x / Bacteria: x      < from: US Transvaginal (24 @ 01:21) >  FINDINGS:  Uterus: 8.3 cm x 4.8 cm x 5.2 cm. 3.3 x 2.5 x 2.5 cm exophytic lesion off   the right/anterior lower uterine segment most likely a fibroid.  Endometrium: 24 mm. thickened and heterogenous.    Neither ovary is visible.    Fluid: None.    IMPRESSION:  Abnormal heterogenous and thickened endometrium again demonstrated. This   warrants workup to exclude endometrial neoplasm which could appear this   way.    3.3 cm in greatest dimension exophytic lesion off the right/anterior   lower uterine segment most likely a fibroid.    Neither ovary was visible.    If it is clinically necessary to more definitively image these   structures, GYN protocol MRI with and without gadolinium would do so.    < end of copied text >

## 2024-04-10 LAB
ANION GAP SERPL CALC-SCNC: 14 MMOL/L — SIGNIFICANT CHANGE UP (ref 5–17)
BUN SERPL-MCNC: 34 MG/DL — HIGH (ref 8–20)
CALCIUM SERPL-MCNC: 9 MG/DL — SIGNIFICANT CHANGE UP (ref 8.4–10.5)
CHLORIDE SERPL-SCNC: 98 MMOL/L — SIGNIFICANT CHANGE UP (ref 96–108)
CO2 SERPL-SCNC: 29 MMOL/L — SIGNIFICANT CHANGE UP (ref 22–29)
CREAT SERPL-MCNC: 1.11 MG/DL — SIGNIFICANT CHANGE UP (ref 0.5–1.3)
CULTURE RESULTS: SIGNIFICANT CHANGE UP
CULTURE RESULTS: SIGNIFICANT CHANGE UP
EGFR: 52 ML/MIN/1.73M2 — LOW
GLUCOSE BLDC GLUCOMTR-MCNC: 117 MG/DL — HIGH (ref 70–99)
GLUCOSE BLDC GLUCOMTR-MCNC: 122 MG/DL — HIGH (ref 70–99)
GLUCOSE BLDC GLUCOMTR-MCNC: 130 MG/DL — HIGH (ref 70–99)
GLUCOSE BLDC GLUCOMTR-MCNC: 137 MG/DL — HIGH (ref 70–99)
GLUCOSE BLDC GLUCOMTR-MCNC: 222 MG/DL — HIGH (ref 70–99)
GLUCOSE SERPL-MCNC: 139 MG/DL — HIGH (ref 70–99)
HCT VFR BLD CALC: 38.5 % — SIGNIFICANT CHANGE UP (ref 34.5–45)
HGB BLD-MCNC: 11.4 G/DL — LOW (ref 11.5–15.5)
MAGNESIUM SERPL-MCNC: 2.1 MG/DL — SIGNIFICANT CHANGE UP (ref 1.6–2.6)
MCHC RBC-ENTMCNC: 28.5 PG — SIGNIFICANT CHANGE UP (ref 27–34)
MCHC RBC-ENTMCNC: 29.6 GM/DL — LOW (ref 32–36)
MCV RBC AUTO: 96.3 FL — SIGNIFICANT CHANGE UP (ref 80–100)
PLATELET # BLD AUTO: 251 K/UL — SIGNIFICANT CHANGE UP (ref 150–400)
POTASSIUM SERPL-MCNC: 4.5 MMOL/L — SIGNIFICANT CHANGE UP (ref 3.5–5.3)
POTASSIUM SERPL-SCNC: 4.5 MMOL/L — SIGNIFICANT CHANGE UP (ref 3.5–5.3)
RBC # BLD: 4 M/UL — SIGNIFICANT CHANGE UP (ref 3.8–5.2)
RBC # FLD: 15.5 % — HIGH (ref 10.3–14.5)
SODIUM SERPL-SCNC: 141 MMOL/L — SIGNIFICANT CHANGE UP (ref 135–145)
SPECIMEN SOURCE: SIGNIFICANT CHANGE UP
SPECIMEN SOURCE: SIGNIFICANT CHANGE UP
WBC # BLD: 8.32 K/UL — SIGNIFICANT CHANGE UP (ref 3.8–10.5)
WBC # FLD AUTO: 8.32 K/UL — SIGNIFICANT CHANGE UP (ref 3.8–10.5)

## 2024-04-10 PROCEDURE — 88342 IMHCHEM/IMCYTCHM 1ST ANTB: CPT | Mod: 26

## 2024-04-10 PROCEDURE — 99232 SBSQ HOSP IP/OBS MODERATE 35: CPT

## 2024-04-10 PROCEDURE — 88305 TISSUE EXAM BY PATHOLOGIST: CPT | Mod: 26

## 2024-04-10 PROCEDURE — 88341 IMHCHEM/IMCYTCHM EA ADD ANTB: CPT | Mod: 26

## 2024-04-10 RX ORDER — HYDROMORPHONE HYDROCHLORIDE 2 MG/ML
0.5 INJECTION INTRAMUSCULAR; INTRAVENOUS; SUBCUTANEOUS
Refills: 0 | Status: DISCONTINUED | OUTPATIENT
Start: 2024-04-10 | End: 2024-04-10

## 2024-04-10 RX ORDER — FENTANYL CITRATE 50 UG/ML
25 INJECTION INTRAVENOUS
Refills: 0 | Status: DISCONTINUED | OUTPATIENT
Start: 2024-04-10 | End: 2024-04-10

## 2024-04-10 RX ORDER — ONDANSETRON 8 MG/1
4 TABLET, FILM COATED ORAL ONCE
Refills: 0 | Status: DISCONTINUED | OUTPATIENT
Start: 2024-04-10 | End: 2024-04-10

## 2024-04-10 RX ADMIN — CEFTRIAXONE 1000 MILLIGRAM(S): 500 INJECTION, POWDER, FOR SOLUTION INTRAMUSCULAR; INTRAVENOUS at 08:20

## 2024-04-10 RX ADMIN — BUDESONIDE AND FORMOTEROL FUMARATE DIHYDRATE 2 PUFF(S): 160; 4.5 AEROSOL RESPIRATORY (INHALATION) at 08:20

## 2024-04-10 RX ADMIN — ATORVASTATIN CALCIUM 80 MILLIGRAM(S): 80 TABLET, FILM COATED ORAL at 21:14

## 2024-04-10 RX ADMIN — Medication 20 MILLIGRAM(S): at 13:28

## 2024-04-10 RX ADMIN — Medication 25 MILLIGRAM(S): at 06:24

## 2024-04-10 RX ADMIN — Medication 100 MICROGRAM(S): at 06:24

## 2024-04-10 RX ADMIN — GABAPENTIN 100 MILLIGRAM(S): 400 CAPSULE ORAL at 06:24

## 2024-04-10 RX ADMIN — INSULIN GLARGINE 20 UNIT(S): 100 INJECTION, SOLUTION SUBCUTANEOUS at 08:20

## 2024-04-10 RX ADMIN — Medication 20 MILLIGRAM(S): at 06:24

## 2024-04-10 RX ADMIN — ENOXAPARIN SODIUM 40 MILLIGRAM(S): 100 INJECTION SUBCUTANEOUS at 21:15

## 2024-04-10 RX ADMIN — Medication 4: at 08:19

## 2024-04-10 RX ADMIN — Medication 0.25 MILLIGRAM(S): at 11:56

## 2024-04-10 RX ADMIN — PANTOPRAZOLE SODIUM 40 MILLIGRAM(S): 20 TABLET, DELAYED RELEASE ORAL at 06:24

## 2024-04-10 RX ADMIN — MONTELUKAST 10 MILLIGRAM(S): 4 TABLET, CHEWABLE ORAL at 08:21

## 2024-04-10 RX ADMIN — Medication 1 MILLIGRAM(S): at 08:21

## 2024-04-10 RX ADMIN — NYSTATIN CREAM 1 APPLICATION(S): 100000 CREAM TOPICAL at 06:25

## 2024-04-10 NOTE — BRIEF OPERATIVE NOTE - OPERATION/FINDINGS
very deep pelvis. cervix difficult to visualize until large speculum used - uterus enlarged, mod amt tissue obtained on D&C, suspicious for pathology

## 2024-04-10 NOTE — CHART NOTE - NSCHARTNOTEFT_GEN_A_CORE
Pt consented and bedside EMB attempted, pt tolerated well, however unable to gain adequate exposure and access to the cervix 2/2 posterior location and long vaginal canal.   Will move forward with planned OR D&C. Pt NPO, cleared by cardiology and placed on the OR add on schedule for today.   All questions and concerns addressed.     Dr. Baires present at bedside Pt consented and bedside EMB attempted, pt tolerated well, however unable to gain adequate exposure and access to the cervix 2/2 posterior location and long vaginal canal.   Will move forward with planned OR D&C. Pt NPO, cleared by cardiology and placed on the OR add on schedule for today.   All questions and concerns addressed.     Dr. Baires present at bedside    Addendum by Dr ALVARO Baires :  As noted above, after reviewing R/B/A EMB vs D&C with pt and her , decision was made to proceed with bedside EMB.  Pt requested to be premedicated with Xanax which was given after written consent taken.  Despite good positioning and pt tolerance to exam, biopsy was not possible due to a very long vaginal canal and laxity of the vaginal sidewalls making visualization of the cervix inmpossible with the instruments available.  EMB attempts were discontinued and pt agreed to proceed with D&C in the OR.  OR made aware.

## 2024-04-10 NOTE — PROGRESS NOTE ADULT - REASON FOR ADMISSION
Severe Sepsis/UTI/Elevated Troponin c/b Hypercapnic Resp Failure

## 2024-04-10 NOTE — PROGRESS NOTE ADULT - SUBJECTIVE AND OBJECTIVE BOX
[place] [facility] - [facility], THE HEART CENTER                                   [address] [place] [address]                                                      PHONE: [phone]                                                         FAX: [phone]  [email or web address]  ---------------------------------------------------------------------------------------------------------------------------------    Overnight events/patient complaints:      NAD feeling well today     penicillin (Unknown)  Dilaudid (Nausea)  Kiwi (Unknown)    MEDICATIONS  (STANDING):  atorvastatin 80 milliGRAM(s) Oral at bedtime  budesonide 160 MICROgram(s)/formoterol 4.5 MICROgram(s) Inhaler 2 Puff(s) Inhalation two times a day  cefTRIAXone Injectable. 1000 milliGRAM(s) IV Push every 24 hours  dextrose 10% Bolus 125 milliLiter(s) IV Bolus once  dextrose 5%. 1000 milliLiter(s) (50 mL/Hr) IV Continuous <Continuous>  dextrose 5%. 1000 milliLiter(s) (100 mL/Hr) IV Continuous <Continuous>  dextrose 50% Injectable 12.5 Gram(s) IV Push once  dextrose 50% Injectable 25 Gram(s) IV Push once  enoxaparin Injectable 40 milliGRAM(s) SubCutaneous every 24 hours  folic acid 1 milliGRAM(s) Oral daily  gabapentin 100 milliGRAM(s) Oral two times a day  glucagon  Injectable 1 milliGRAM(s) IntraMuscular once  insulin glargine Injectable (LANTUS) 20 Unit(s) SubCutaneous every morning  insulin lispro (ADMELOG) corrective regimen sliding scale   SubCutaneous three times a day before meals  insulin lispro (ADMELOG) corrective regimen sliding scale   SubCutaneous at bedtime  levothyroxine 100 MICROGram(s) Oral daily  metoprolol succinate ER 25 milliGRAM(s) Oral daily  montelukast 10 milliGRAM(s) Oral daily  nystatin Powder 1 Application(s) Topical two times a day  pantoprazole    Tablet 40 milliGRAM(s) Oral before breakfast  torsemide 20 milliGRAM(s) Oral two times a day    MEDICATIONS  (PRN):  acetaminophen     Tablet .. 650 milliGRAM(s) Oral every 6 hours PRN Temp greater or equal to 38C (100.4F), Mild Pain (1 - 3)  ALPRAZolam 0.25 milliGRAM(s) Oral daily PRN for anxiety  aluminum hydroxide/magnesium hydroxide/simethicone Suspension 30 milliLiter(s) Oral every 4 hours PRN Dyspepsia  dextrose Oral Gel 15 Gram(s) Oral once PRN Blood Glucose LESS THAN 70 milliGRAM(s)/deciliter  melatonin 3 milliGRAM(s) Oral at bedtime PRN Insomnia  ondansetron Injectable 4 milliGRAM(s) IV Push every 8 hours PRN Nausea and/or Vomiting  oxycodone    5 mG/acetaminophen 325 mG 2 Tablet(s) Oral every 6 hours PRN Severe Pain (7 - 10)  oxycodone    5 mG/acetaminophen 325 mG 1 Tablet(s) Oral every 6 hours PRN Moderate Pain (4 - 6)  traZODone 50 milliGRAM(s) Oral daily PRN for insomnia      Vital Signs Last 24 Hrs  T(C): 36.3 (10 Apr 2024 08:30), Max: 36.7 (09 Apr 2024 20:11)  T(F): 97.4 (10 Apr 2024 08:30), Max: 98.1 (09 Apr 2024 20:11)  HR: 67 (10 Apr 2024 08:30) (66 - 81)  BP: 110/70 (10 Apr 2024 08:30) (110/70 - 145/75)  BP(mean): 98 (10 Apr 2024 04:59) (95 - 98)  RR: 18 (10 Apr 2024 08:30) (18 - 20)  SpO2: 97% (10 Apr 2024 08:30) (95% - 100%)    Parameters below as of 10 Apr 2024 08:30  Patient On (Oxygen Delivery Method): nasal cannula  O2 Flow (L/min): 2    ICU Vital Signs Last 24 Hrs  BLESSING TOZZI  I&O's Detail    09 Apr 2024 07:01  -  10 Apr 2024 07:00  --------------------------------------------------------  IN:  Total IN: 0 mL    OUT:    Voided (mL): 1200 mL  Total OUT: 1200 mL    Total NET: -1200 mL        I&O's Summary    09 Apr 2024 07:01  -  10 Apr 2024 07:00  --------------------------------------------------------  IN: 0 mL / OUT: 1200 mL / NET: -1200 mL      Drug Dosing Weight  BLESSING ANDRADE      PHYSICAL EXAM:  General: Appears well developed, alert and cooperative.  HEENT: Head; normocephalic, atraumatic.  Eyes: Pupils reactive, cornea wnl.  Neck: Supple, no nodes adenopathy, no NVD or carotid bruit or thyromegaly.  CARDIOVASCULAR: Normal S1 and S2, 2/6 murmur, rub, gallop or lift.   LUNGS: No rales, rhonchi or wheeze. Normal breath sounds bilaterally.  ABDOMEN: Soft, nontender without mass or organomegaly. bowel sounds normoactive.  EXTREMITIES: No clubbing, cyanosis or edema. Distal pulses wnl.   SKIN: warm and dry with normal turgor.  NEURO: Alert/oriented x 3/normal motor exam. No pathologic reflexes.    PSYCH: normal affect.        LABS:                        11.4   8.32  )-----------( 251      ( 10 Apr 2024 05:10 )             38.5     04-10    141  |  98  |  34.0<H>  ----------------------------<  139<H>  4.5   |  29.0  |  1.11    Ca    9.0      10 Apr 2024 05:10  Mg     2.1     04-10      BLESSING ANDRADE        Urinalysis Basic - ( 10 Apr 2024 05:10 )    Color: x / Appearance: x / SG: x / pH: x  Gluc: 139 mg/dL / Ketone: x  / Bili: x / Urobili: x   Blood: x / Protein: x / Nitrite: x   Leuk Esterase: x / RBC: x / WBC x   Sq Epi: x / Non Sq Epi: x / Bacteria: x        RADIOLOGY & ADDITIONAL STUDIES:    INTERPRETATION OF TELEMETRY (personally reviewed):      CONCLUSIONS:      1. The interventricular septum is flattened in diastole ('D' shaped left ventricle) consistent with right ventricular volume overload. Left ventricular systolic function is normal with an ejection fraction visually estimated at 65 to 70 %. There are no regional wallmotion abnormalities seen.   2. There is mild (grade 1) left ventricular diastolic dysfunction, with normal filling pressure.   3. Mildly enlarged right ventricular cavity size, with increased wall thickness, and mildly reduced systolic function. Tricuspid annular plane systolic excursion (TAPSE) is 2.2 cm (normal >=1.7 cm).   4. The left atrium is mildly dilated.   5. The right atrium is severely dilated.   6. Mild to moderate mitral regurgitation.   7. Moderate to severe tricuspid regurgitation. Systolic flow reversal in the hepatic vein supports the finding of severe tricuspid regurgitation.   8. Dilated pulmonary artery.   9. Estimated pulmonary artery systolic pressure is 70 mmHg, consistent with severe pulmonary hypertension.  10. Severely dilated main pulmonary artery.  11. The inferior vena cava is dilated measuring 2.23 cm in diameter, (dilated >2.1cm) with abnormal inspiratory collapse (abnormal <50%) consistent with elevated right atrial pressure (~15, range 10-20mmHg).  12. Mild non-mobile focal atheroma in the visualized portions of the proximal ascending aorta.  13. No pericardial effusion seen.    < end of copied text >        < from: CT Angio Chest PE Protocol w/ IV Cont (04.05.24 @ 16:11) >    IMPRESSION:  No pulmonary embolism.    Dilated main pulmonary artery measuring 3.9 cm, a nonspecific finding   which can be seen in the setting of pulmonary hypertension.    Possible 3.5 cm right adnexal mass. Additionally, there is fluid and   debris within the endometrial canal which is an atypical finding fora   patient of this age. Recommend pelvic ultrasound for further assessment.   Neoplasm cannot be excluded.    --- End of Report ---      < end of copied text >          73y Female with prior history of  HFPEF, CAD s/p CABG, pHTN, ROMANA with Oct 2021 admission [facility] for chronic hypoxemic/hypercapneic respiratory failure requiring intubations/p Cardiomems  presents to the ED c/o L francine pain and dysuria since 2-3 days ago. Also reports shortness of breath and LLQ abd pain.  at bedside provides most of the history. He reports that pt was being treated for UTI and he noted that pt was getting more weak and lethargic.   Noted to have elevated troponin and not C/W with ACS    Acute on chronic HFpEF/PTHN     TTE normal EF severe TR PHTN PA ~ 70 mmHg.    Volume status stable     Negative for ACS    Patient undergoing a moderate risk procedure in which patient has a moderate CV risk but no active CV contraindication at this time   
                [place] [facility] - [facility], THE HEART CENTER                                   [address] [place] [address]                                                      PHONE: [phone]                                                         FAX: [phone]  [email or web address]  ---------------------------------------------------------------------------------------------------------------------------------    Overnight events/patient complaints:      NAD feeling well today     penicillin (Unknown)  Dilaudid (Nausea)  Kiwi (Unknown)    MEDICATIONS  (STANDING):  atorvastatin 80 milliGRAM(s) Oral at bedtime  budesonide 160 MICROgram(s)/formoterol 4.5 MICROgram(s) Inhaler 2 Puff(s) Inhalation two times a day  cefTRIAXone Injectable. 1000 milliGRAM(s) IV Push every 24 hours  dextrose 10% Bolus 125 milliLiter(s) IV Bolus once  dextrose 5%. 1000 milliLiter(s) (50 mL/Hr) IV Continuous <Continuous>  dextrose 5%. 1000 milliLiter(s) (100 mL/Hr) IV Continuous <Continuous>  dextrose 50% Injectable 12.5 Gram(s) IV Push once  dextrose 50% Injectable 25 Gram(s) IV Push once  enoxaparin Injectable 40 milliGRAM(s) SubCutaneous every 24 hours  folic acid 1 milliGRAM(s) Oral daily  furosemide   Injectable 40 milliGRAM(s) IV Push daily  gabapentin 100 milliGRAM(s) Oral two times a day  glucagon  Injectable 1 milliGRAM(s) IntraMuscular once  insulin glargine Injectable (LANTUS) 20 Unit(s) SubCutaneous every morning  insulin lispro (ADMELOG) corrective regimen sliding scale   SubCutaneous three times a day before meals  insulin lispro (ADMELOG) corrective regimen sliding scale   SubCutaneous at bedtime  levothyroxine 100 MICROGram(s) Oral daily  metoprolol succinate ER 25 milliGRAM(s) Oral daily  montelukast 10 milliGRAM(s) Oral daily  nystatin Powder 1 Application(s) Topical two times a day  pantoprazole    Tablet 40 milliGRAM(s) Oral before breakfast  vancomycin  IVPB 1500 milliGRAM(s) IV Intermittent every 24 hours    MEDICATIONS  (PRN):  acetaminophen     Tablet .. 650 milliGRAM(s) Oral every 6 hours PRN Temp greater or equal to 38C (100.4F), Mild Pain (1 - 3)  ALPRAZolam 0.25 milliGRAM(s) Oral daily PRN for anxiety  aluminum hydroxide/magnesium hydroxide/simethicone Suspension 30 milliLiter(s) Oral every 4 hours PRN Dyspepsia  dextrose Oral Gel 15 Gram(s) Oral once PRN Blood Glucose LESS THAN 70 milliGRAM(s)/deciliter  melatonin 3 milliGRAM(s) Oral at bedtime PRN Insomnia  ondansetron Injectable 4 milliGRAM(s) IV Push every 8 hours PRN Nausea and/or Vomiting  oxycodone    5 mG/acetaminophen 325 mG 2 Tablet(s) Oral every 6 hours PRN Severe Pain (7 - 10)  oxycodone    5 mG/acetaminophen 325 mG 1 Tablet(s) Oral every 6 hours PRN Moderate Pain (4 - 6)  traZODone 50 milliGRAM(s) Oral daily PRN for insomnia      Vital Signs Last 24 Hrs  T(C): 36.7 (08 Apr 2024 08:45), Max: 36.9 (08 Apr 2024 05:01)  T(F): 98.1 (08 Apr 2024 08:45), Max: 98.5 (08 Apr 2024 05:01)  HR: 79 (08 Apr 2024 08:45) (64 - 79)  BP: 124/70 (08 Apr 2024 08:45) (124/58 - 155/77)  BP(mean): --  RR: 18 (08 Apr 2024 08:45) (18 - 20)  SpO2: 97% (08 Apr 2024 08:45) (92% - 97%)    Parameters below as of 08 Apr 2024 08:45  Patient On (Oxygen Delivery Method): nasal cannula  O2 Flow (L/min): 2    ICU Vital Signs Last 24 Hrs  BLESSING ANDRADE  I&O's Detail    07 Apr 2024 07:01  -  08 Apr 2024 07:00  --------------------------------------------------------  IN:    IV PiggyBack: 500 mL    Oral Fluid: 720 mL  Total IN: 1220 mL    OUT:    Voided (mL): 3025 mL  Total OUT: 3025 mL    Total NET: -1805 mL        I&O's Summary    07 Apr 2024 07:01  -  08 Apr 2024 07:00  --------------------------------------------------------  IN: 1220 mL / OUT: 3025 mL / NET: -1805 mL      Drug Dosing Weight  BLESSING LUPE      PHYSICAL EXAM:  General: Appears well developed, alert and cooperative.  HEENT: Head; normocephalic, atraumatic.  Eyes: Pupils reactive, cornea wnl.  Neck: Supple, no nodes adenopathy, no NVD or carotid bruit or thyromegaly.  CARDIOVASCULAR: Normal S1 and S2, No murmur, rub, gallop or lift.   LUNGS: No rales, rhonchi or wheeze. Normal breath sounds bilaterally.  ABDOMEN: Soft, nontender without mass or organomegaly. bowel sounds normoactive.  EXTREMITIES: No clubbing, cyanosis or edema. Distal pulses wnl.   SKIN: warm and dry with normal turgor.  NEURO: Alert/oriented x 3/normal motor exam. No pathologic reflexes.    PSYCH: normal affect.        LABS:                        11.5   10.05 )-----------( 221      ( 08 Apr 2024 05:05 )             37.4     04-08    142  |  98  |  34.9<H>  ----------------------------<  235<H>  4.5   |  33.0<H>  |  1.24    Ca    8.7      08 Apr 2024 05:05  Mg     1.9     04-08      BLESSING ANDRADE        Urinalysis Basic - ( 08 Apr 2024 05:05 )    Color: x / Appearance: x / SG: x / pH: x  Gluc: 235 mg/dL / Ketone: x  / Bili: x / Urobili: x   Blood: x / Protein: x / Nitrite: x   Leuk Esterase: x / RBC: x / WBC x   Sq Epi: x / Non Sq Epi: x / Bacteria: x        RADIOLOGY & ADDITIONAL STUDIES:    INTERPRETATION OF TELEMETRY (personally reviewed):    < from: TTE W or WO Ultrasound Enhancing Agent (04.07.24 @ 11:40) >     CONCLUSIONS:      1. The interventricular septum is flattened in diastole ('D' shaped left ventricle) consistent with right ventricular volume overload. Left ventricular systolic function is normal with an ejection fraction visually estimated at 65 to 70 %. There are no regional wallmotion abnormalities seen.   2. There is mild (grade 1) left ventricular diastolic dysfunction, with normal filling pressure.   3. Mildly enlarged right ventricular cavity size, with increased wall thickness, and mildly reduced systolic function. Tricuspid annular plane systolic excursion (TAPSE) is 2.2 cm (normal >=1.7 cm).   4. The left atrium is mildly dilated.   5. The right atrium is severely dilated.   6. Mild to moderate mitral regurgitation.   7. Moderate to severe tricuspid regurgitation. Systolic flow reversal in the hepatic vein supports the finding of severe tricuspid regurgitation.   8. Dilated pulmonary artery.   9. Estimated pulmonary artery systolic pressure is 70 mmHg, consistent with severe pulmonary hypertension.  10. Severely dilated main pulmonary artery.  11. The inferior vena cava is dilated measuring 2.23 cm in diameter, (dilated >2.1cm) with abnormal inspiratory collapse (abnormal <50%) consistent with elevated right atrial pressure (~15, range 10-20mmHg).  12. Mild non-mobile focal atheroma in the visualized portions of the proximal ascending aorta.  13. No pericardial effusion seen.    < end of copied text >        < from: CT Angio Chest PE Protocol w/ IV Cont (04.05.24 @ 16:11) >    IMPRESSION:  No pulmonary embolism.    Dilated main pulmonary artery measuring 3.9 cm, a nonspecific finding   which can be seen in the setting of pulmonary hypertension.    Possible 3.5 cm right adnexal mass. Additionally, there is fluid and   debris within the endometrial canal which is an atypical finding fora   patient of this age. Recommend pelvic ultrasound for further assessment.   Neoplasm cannot be excluded.    --- End of Report ---      < end of copied text >          73y Female with prior history of  HFPEF, CAD s/p CABG, pHTN, ROMANA with Oct 2021 admission [facility] for chronic hypoxemic/hypercapneic respiratory failure requiring intubations/p Cardiomems  presents to the ED c/o L francine pain and dysuria since 2-3 days ago. Also reports shortness of breath and LLQ abd pain.  at bedside provides most of the history. He reports that pt was being treated for UTI and he noted that pt was getting more weak and lethargic.   Noted to have elevated troponin and not C/W with ACS    Acute on chronic HFpEF/PTHN improved with IV Lasix     TTE normal EF severe TR PHTN PA ~ 70 mmHg.    Plan  will hold off SGLT2 due to recurrent UTI   Continue current CV medications   Change IV Lasix to po pre discharge   Pulmonary and cardiology follow up out patient     
Awaiting OR for D&C.  
[facility] Division of Hospital Medicine  Cielo Soto MD   I'm reachable on [employer] Teams      Patient is a 73y old  Female who presents with a chief complaint of Severe Sepsis/UTI/Elevated Troponin c/b Hypercapnic Resp Failure (08 Apr 2024 13:58)      SUBJECTIVE / OVERNIGHT EVENTS:   Patient was seen and examined during rounds  -She repots feeling better  -Evaluated by ID and recs for IV ceftriaxone till 04/11  - US pelvis concerning for endometritis. GYN consulted      12 points ROS negative except above    MEDICATIONS  (STANDING):  atorvastatin 80 milliGRAM(s) Oral at bedtime  budesonide 160 MICROgram(s)/formoterol 4.5 MICROgram(s) Inhaler 2 Puff(s) Inhalation two times a day  cefTRIAXone Injectable. 1000 milliGRAM(s) IV Push every 24 hours  dextrose 10% Bolus 125 milliLiter(s) IV Bolus once  dextrose 5%. 1000 milliLiter(s) (50 mL/Hr) IV Continuous <Continuous>  dextrose 5%. 1000 milliLiter(s) (100 mL/Hr) IV Continuous <Continuous>  dextrose 50% Injectable 12.5 Gram(s) IV Push once  dextrose 50% Injectable 25 Gram(s) IV Push once  enoxaparin Injectable 40 milliGRAM(s) SubCutaneous every 24 hours  folic acid 1 milliGRAM(s) Oral daily  gabapentin 100 milliGRAM(s) Oral two times a day  glucagon  Injectable 1 milliGRAM(s) IntraMuscular once  insulin glargine Injectable (LANTUS) 20 Unit(s) SubCutaneous every morning  insulin lispro (ADMELOG) corrective regimen sliding scale   SubCutaneous three times a day before meals  insulin lispro (ADMELOG) corrective regimen sliding scale   SubCutaneous at bedtime  levothyroxine 100 MICROGram(s) Oral daily  metoprolol succinate ER 25 milliGRAM(s) Oral daily  montelukast 10 milliGRAM(s) Oral daily  nystatin Powder 1 Application(s) Topical two times a day  pantoprazole    Tablet 40 milliGRAM(s) Oral before breakfast  torsemide 20 milliGRAM(s) Oral two times a day    MEDICATIONS  (PRN):  acetaminophen     Tablet .. 650 milliGRAM(s) Oral every 6 hours PRN Temp greater or equal to 38C (100.4F), Mild Pain (1 - 3)  ALPRAZolam 0.25 milliGRAM(s) Oral daily PRN for anxiety  aluminum hydroxide/magnesium hydroxide/simethicone Suspension 30 milliLiter(s) Oral every 4 hours PRN Dyspepsia  dextrose Oral Gel 15 Gram(s) Oral once PRN Blood Glucose LESS THAN 70 milliGRAM(s)/deciliter  melatonin 3 milliGRAM(s) Oral at bedtime PRN Insomnia  ondansetron Injectable 4 milliGRAM(s) IV Push every 8 hours PRN Nausea and/or Vomiting  oxycodone    5 mG/acetaminophen 325 mG 2 Tablet(s) Oral every 6 hours PRN Severe Pain (7 - 10)  oxycodone    5 mG/acetaminophen 325 mG 1 Tablet(s) Oral every 6 hours PRN Moderate Pain (4 - 6)  traZODone 50 milliGRAM(s) Oral daily PRN for insomnia        I&O's Summary    07 Apr 2024 07:01  -  08 Apr 2024 07:00  --------------------------------------------------------  IN: 1220 mL / OUT: 3025 mL / NET: -1805 mL        PHYSICAL EXAM:  Vital Signs Last 24 Hrs  T(C): 36.8 (08 Apr 2024 16:33), Max: 36.9 (08 Apr 2024 05:01)  T(F): 98.2 (08 Apr 2024 16:33), Max: 98.5 (08 Apr 2024 05:01)  HR: 67 (08 Apr 2024 16:33) (64 - 79)  BP: 139/77 (08 Apr 2024 16:33) (124/70 - 155/77)  BP(mean): --  RR: 18 (08 Apr 2024 16:33) (18 - 20)  SpO2: 95% (08 Apr 2024 16:33) (95% - 97%)    Parameters below as of 08 Apr 2024 16:33  Patient On (Oxygen Delivery Method): nasal cannula  O2 Flow (L/min): 2      CONSTITUTIONAL: NAD, Not in acute distress  EYES:  EOMI, conjunctiva and sclera clear  ENMT: , neck supple , non-tender  RESPIRATORY: Normal respiratory effort; lungs are clear to auscultation bilaterally  CARDIOVASCULAR: S1S2 present  ABDOMEN: soft. bowel sound present  MUSCLOSKELETAL: ; no clubbing or cyanosis of digits;   PSYCH: A+O to person, place, and time; affect appropriate  NEUROLOGY: CN, motor and sensory intact   SKIN: No rashes; no palpable lesions  Extremity: No bilateral edema      LABS:                        11.5   10.05 )-----------( 221      ( 08 Apr 2024 05:05 )             37.4     04-08    142  |  98  |  34.9<H>  ----------------------------<  235<H>  4.5   |  33.0<H>  |  1.24    Ca    8.7      08 Apr 2024 05:05  Mg     1.9     04-08            Urinalysis Basic - ( 08 Apr 2024 05:05 )    Color: x / Appearance: x / SG: x / pH: x  Gluc: 235 mg/dL / Ketone: x  / Bili: x / Urobili: x   Blood: x / Protein: x / Nitrite: x   Leuk Esterase: x / RBC: x / WBC x   Sq Epi: x / Non Sq Epi: x / Bacteria: x        CAPILLARY BLOOD GLUCOSE      POCT Blood Glucose.: 259 mg/dL (08 Apr 2024 16:54)  POCT Blood Glucose.: 122 mg/dL (08 Apr 2024 11:22)  POCT Blood Glucose.: 208 mg/dL (08 Apr 2024 08:42)  POCT Blood Glucose.: 222 mg/dL (07 Apr 2024 21:21)      Labs reviewed:    RADIOLOGY & ADDITIONAL TESTS:  Imaging Personally Reviewed:  Electrocardiogram Personally Reviewed:  
[facility] Division of Hospital Medicine  Cielo Soto MD   I'm reachable on [employer] Teams      Patient is a 73y old  Female who presents with a chief complaint of Severe Sepsis/UTI/Elevated Troponin c/b Hypercapnic Resp Failure (05 Apr 2024 23:14)      SUBJECTIVE / OVERNIGHT EVENTS:   Patient was seen and examined during rounds    She still reports left flank pain  US pelvis, Ucx and Bcx pending  she was weaned down to [place]  unable to get ABG in morning. Will get VBG tomorrow morning    12 points ROS negative except above    MEDICATIONS  (STANDING):  atorvastatin 80 milliGRAM(s) Oral at bedtime  budesonide 160 MICROgram(s)/formoterol 4.5 MICROgram(s) Inhaler 2 Puff(s) Inhalation two times a day  cefTRIAXone Injectable. 1000 milliGRAM(s) IV Push every 24 hours  dextrose 10% Bolus 125 milliLiter(s) IV Bolus once  dextrose 5%. 1000 milliLiter(s) (50 mL/Hr) IV Continuous <Continuous>  dextrose 5%. 1000 milliLiter(s) (100 mL/Hr) IV Continuous <Continuous>  dextrose 50% Injectable 12.5 Gram(s) IV Push once  dextrose 50% Injectable 25 Gram(s) IV Push once  enoxaparin Injectable 40 milliGRAM(s) SubCutaneous every 24 hours  folic acid 1 milliGRAM(s) Oral daily  gabapentin 100 milliGRAM(s) Oral two times a day  glucagon  Injectable 1 milliGRAM(s) IntraMuscular once  insulin glargine Injectable (LANTUS) 20 Unit(s) SubCutaneous every morning  insulin lispro (ADMELOG) corrective regimen sliding scale   SubCutaneous three times a day before meals  insulin lispro (ADMELOG) corrective regimen sliding scale   SubCutaneous at bedtime  levothyroxine 100 MICROGram(s) Oral daily  metoprolol succinate ER 25 milliGRAM(s) Oral daily  montelukast 10 milliGRAM(s) Oral daily  nystatin Powder 1 Application(s) Topical two times a day  pantoprazole    Tablet 40 milliGRAM(s) Oral before breakfast  torsemide 20 milliGRAM(s) Oral two times a day    MEDICATIONS  (PRN):  acetaminophen     Tablet .. 650 milliGRAM(s) Oral every 6 hours PRN Temp greater or equal to 38C (100.4F), Mild Pain (1 - 3)  ALPRAZolam 0.25 milliGRAM(s) Oral daily PRN for anxiety  aluminum hydroxide/magnesium hydroxide/simethicone Suspension 30 milliLiter(s) Oral every 4 hours PRN Dyspepsia  dextrose Oral Gel 15 Gram(s) Oral once PRN Blood Glucose LESS THAN 70 milliGRAM(s)/deciliter  melatonin 3 milliGRAM(s) Oral at bedtime PRN Insomnia  ondansetron Injectable 4 milliGRAM(s) IV Push every 8 hours PRN Nausea and/or Vomiting  oxycodone    5 mG/acetaminophen 325 mG 1 Tablet(s) Oral every 6 hours PRN Moderate Pain (4 - 6)  oxycodone    5 mG/acetaminophen 325 mG 2 Tablet(s) Oral every 6 hours PRN Severe Pain (7 - 10)  traZODone 50 milliGRAM(s) Oral daily PRN for insomnia        I&O's Summary      PHYSICAL EXAM:  Vital Signs Last 24 Hrs  T(C): 37.4 (06 Apr 2024 15:35), Max: 37.4 (06 Apr 2024 15:35)  T(F): 99.3 (06 Apr 2024 15:35), Max: 99.3 (06 Apr 2024 15:35)  HR: 76 (06 Apr 2024 15:35) (68 - 79)  BP: 133/76 (06 Apr 2024 15:35) (116/66 - 145/79)  BP(mean): --  RR: 18 (06 Apr 2024 15:35) (18 - 19)  SpO2: 99% (06 Apr 2024 15:35) (93% - 99%)    Parameters below as of 06 Apr 2024 15:35  Patient On (Oxygen Delivery Method): room air        CONSTITUTIONAL: NAD, Not in acute distress  EYES:  EOMI, conjunctiva and sclera clear  ENMT: , neck supple , non-tender  RESPIRATORY: Normal respiratory effort; lungs are clear to auscultation bilaterally  CARDIOVASCULAR: S1S2 present  ABDOMEN: soft. bowel sound present  MUSCLOSKELETAL: ; no clubbing or cyanosis of digits;   PSYCH: A+O to person, place, and time; affect appropriate  NEUROLOGY: CN, motor and sensory intact   SKIN: No rashes; no palpable lesions  Extremity: No bilateral edema      LABS:                        11.3   11.35 )-----------( 219      ( 06 Apr 2024 01:12 )             38.5     04-06    142  |  103  |  25.6<H>  ----------------------------<  115<H>  5.6<H>   |  33.0<H>  |  1.15    Ca    8.8      06 Apr 2024 01:12    TPro  6.6  /  Alb  3.2<L>  /  TBili  0.3<L>  /  DBili  x   /  AST  9   /  ALT  8   /  AlkPhos  123<H>  04-06    PT/INR - ( 05 Apr 2024 12:20 )   PT: 12.1 sec;   INR: 1.09 ratio         PTT - ( 05 Apr 2024 12:20 )  PTT:31.8 sec      Urinalysis Basic - ( 06 Apr 2024 01:12 )    Color: x / Appearance: x / SG: x / pH: x  Gluc: 115 mg/dL / Ketone: x  / Bili: x / Urobili: x   Blood: x / Protein: x / Nitrite: x   Leuk Esterase: x / RBC: x / WBC x   Sq Epi: x / Non Sq Epi: x / Bacteria: x        CAPILLARY BLOOD GLUCOSE      POCT Blood Glucose.: 140 mg/dL (06 Apr 2024 11:48)  POCT Blood Glucose.: 122 mg/dL (06 Apr 2024 08:31)      Labs reviewed:    RADIOLOGY & ADDITIONAL TESTS:  Imaging Personally Reviewed:  Electrocardiogram Personally Reviewed:  
[facility] Division of Hospital Medicine  Cielo Soto MD   I'm reachable on [employer] Teams      Patient is a 73y old  Female who presents with a chief complaint of Severe Sepsis/UTI/Elevated Troponin c/b Hypercapnic Resp Failure (09 Apr 2024 08:46)      SUBJECTIVE / OVERNIGHT EVENTS:   Patient was seen and examined during rounds  - She reports improvement in pain and SOB  -She denied any nausea, vomiting, hematuria or dysuria      12 points ROS negative except above    MEDICATIONS  (STANDING):  atorvastatin 80 milliGRAM(s) Oral at bedtime  budesonide 160 MICROgram(s)/formoterol 4.5 MICROgram(s) Inhaler 2 Puff(s) Inhalation two times a day  cefTRIAXone Injectable. 1000 milliGRAM(s) IV Push every 24 hours  dextrose 10% Bolus 125 milliLiter(s) IV Bolus once  dextrose 5%. 1000 milliLiter(s) (50 mL/Hr) IV Continuous <Continuous>  dextrose 5%. 1000 milliLiter(s) (100 mL/Hr) IV Continuous <Continuous>  dextrose 50% Injectable 12.5 Gram(s) IV Push once  dextrose 50% Injectable 25 Gram(s) IV Push once  enoxaparin Injectable 40 milliGRAM(s) SubCutaneous every 24 hours  folic acid 1 milliGRAM(s) Oral daily  gabapentin 100 milliGRAM(s) Oral two times a day  glucagon  Injectable 1 milliGRAM(s) IntraMuscular once  insulin glargine Injectable (LANTUS) 20 Unit(s) SubCutaneous every morning  insulin lispro (ADMELOG) corrective regimen sliding scale   SubCutaneous three times a day before meals  insulin lispro (ADMELOG) corrective regimen sliding scale   SubCutaneous at bedtime  levothyroxine 100 MICROGram(s) Oral daily  metoprolol succinate ER 25 milliGRAM(s) Oral daily  montelukast 10 milliGRAM(s) Oral daily  nystatin Powder 1 Application(s) Topical two times a day  pantoprazole    Tablet 40 milliGRAM(s) Oral before breakfast  torsemide 20 milliGRAM(s) Oral two times a day    MEDICATIONS  (PRN):  acetaminophen     Tablet .. 650 milliGRAM(s) Oral every 6 hours PRN Temp greater or equal to 38C (100.4F), Mild Pain (1 - 3)  ALPRAZolam 0.25 milliGRAM(s) Oral daily PRN for anxiety  aluminum hydroxide/magnesium hydroxide/simethicone Suspension 30 milliLiter(s) Oral every 4 hours PRN Dyspepsia  dextrose Oral Gel 15 Gram(s) Oral once PRN Blood Glucose LESS THAN 70 milliGRAM(s)/deciliter  melatonin 3 milliGRAM(s) Oral at bedtime PRN Insomnia  ondansetron Injectable 4 milliGRAM(s) IV Push every 8 hours PRN Nausea and/or Vomiting  oxycodone    5 mG/acetaminophen 325 mG 1 Tablet(s) Oral every 6 hours PRN Moderate Pain (4 - 6)  oxycodone    5 mG/acetaminophen 325 mG 2 Tablet(s) Oral every 6 hours PRN Severe Pain (7 - 10)  traZODone 50 milliGRAM(s) Oral daily PRN for insomnia        I&O's Summary    08 Apr 2024 07:01  -  09 Apr 2024 07:00  --------------------------------------------------------  IN: 0 mL / OUT: 600 mL / NET: -600 mL    09 Apr 2024 07:01  -  09 Apr 2024 14:35  --------------------------------------------------------  IN: 0 mL / OUT: 800 mL / NET: -800 mL        PHYSICAL EXAM:  Vital Signs Last 24 Hrs  T(C): 36.4 (09 Apr 2024 08:38), Max: 36.8 (08 Apr 2024 16:33)  T(F): 97.5 (09 Apr 2024 08:38), Max: 98.2 (08 Apr 2024 16:33)  HR: 72 (09 Apr 2024 13:02) (67 - 91)  BP: 129/73 (09 Apr 2024 13:02) (125/70 - 139/77)  BP(mean): --  RR: 18 (09 Apr 2024 08:38) (18 - 19)  SpO2: 95% (09 Apr 2024 08:38) (95% - 99%)    Parameters below as of 09 Apr 2024 05:03  Patient On (Oxygen Delivery Method): BiPAP/CPAP        CONSTITUTIONAL: NAD, Not in acute distress  EYES:  EOMI, conjunctiva and sclera clear  ENMT: , neck supple , non-tender  RESPIRATORY: Normal respiratory effort; lungs are clear to auscultation bilaterally  CARDIOVASCULAR: S1S2 present  ABDOMEN: soft. bowel sound present. mild right CVA tenderness  MUSCLOSKELETAL: ; no clubbing or cyanosis of digits;   PSYCH: A+O to person, place, and time; affect appropriate  NEUROLOGY: CN, motor and sensory intact   SKIN: No rashes; no palpable lesions  Extremity: No bilateral edema      LABS:                        11.4   9.46  )-----------( 226      ( 09 Apr 2024 06:03 )             38.2     04-09    143  |  100  |  31.9<H>  ----------------------------<  104<H>  4.0   |  34.0<H>  |  1.12    Ca    8.8      09 Apr 2024 06:03  Mg     2.0     04-09            Urinalysis Basic - ( 09 Apr 2024 06:03 )    Color: x / Appearance: x / SG: x / pH: x  Gluc: 104 mg/dL / Ketone: x  / Bili: x / Urobili: x   Blood: x / Protein: x / Nitrite: x   Leuk Esterase: x / RBC: x / WBC x   Sq Epi: x / Non Sq Epi: x / Bacteria: x        CAPILLARY BLOOD GLUCOSE      POCT Blood Glucose.: 181 mg/dL (09 Apr 2024 12:19)  POCT Blood Glucose.: 105 mg/dL (09 Apr 2024 07:31)  POCT Blood Glucose.: 155 mg/dL (08 Apr 2024 22:08)  POCT Blood Glucose.: 259 mg/dL (08 Apr 2024 16:54)      Labs reviewed:    RADIOLOGY & ADDITIONAL TESTS:  Imaging Personally Reviewed:  Electrocardiogram Personally Reviewed:  
[facility] Division of Hospital Medicine  Cielo Soto MD   I'm reachable on [email or web address] Teams      Patient is a 73y old  Female who presents with a chief complaint of Severe Sepsis/UTI/Elevated Troponin c/b Hypercapnic Resp Failure (10 Apr 2024 16:53)      SUBJECTIVE / OVERNIGHT EVENTS:   Patient was seen and examined during rounds    -She reports right sided pain.   -Reports improvement in SOB  -She was seen by cardiologist and recommended to continue with D&C  -unable to get bedside D&C and plan for OR.    12 points ROS negative except above    MEDICATIONS  (STANDING):  atorvastatin 80 milliGRAM(s) Oral at bedtime  budesonide 160 MICROgram(s)/formoterol 4.5 MICROgram(s) Inhaler 2 Puff(s) Inhalation two times a day  cefTRIAXone Injectable. 1000 milliGRAM(s) IV Push every 24 hours  dextrose 10% Bolus 125 milliLiter(s) IV Bolus once  dextrose 5%. 1000 milliLiter(s) (50 mL/Hr) IV Continuous <Continuous>  dextrose 5%. 1000 milliLiter(s) (100 mL/Hr) IV Continuous <Continuous>  dextrose 50% Injectable 25 Gram(s) IV Push once  dextrose 50% Injectable 12.5 Gram(s) IV Push once  enoxaparin Injectable 40 milliGRAM(s) SubCutaneous every 24 hours  folic acid 1 milliGRAM(s) Oral daily  gabapentin 100 milliGRAM(s) Oral two times a day  glucagon  Injectable 1 milliGRAM(s) IntraMuscular once  insulin glargine Injectable (LANTUS) 20 Unit(s) SubCutaneous every morning  insulin lispro (ADMELOG) corrective regimen sliding scale   SubCutaneous three times a day before meals  insulin lispro (ADMELOG) corrective regimen sliding scale   SubCutaneous at bedtime  levothyroxine 100 MICROGram(s) Oral daily  metoprolol succinate ER 25 milliGRAM(s) Oral daily  montelukast 10 milliGRAM(s) Oral daily  nystatin Powder 1 Application(s) Topical two times a day  pantoprazole    Tablet 40 milliGRAM(s) Oral before breakfast  torsemide 20 milliGRAM(s) Oral two times a day    MEDICATIONS  (PRN):  acetaminophen     Tablet .. 650 milliGRAM(s) Oral every 6 hours PRN Temp greater or equal to 38C (100.4F), Mild Pain (1 - 3)  ALPRAZolam 0.25 milliGRAM(s) Oral daily PRN for anxiety  aluminum hydroxide/magnesium hydroxide/simethicone Suspension 30 milliLiter(s) Oral every 4 hours PRN Dyspepsia  dextrose Oral Gel 15 Gram(s) Oral once PRN Blood Glucose LESS THAN 70 milliGRAM(s)/deciliter  melatonin 3 milliGRAM(s) Oral at bedtime PRN Insomnia  ondansetron Injectable 4 milliGRAM(s) IV Push every 8 hours PRN Nausea and/or Vomiting  oxycodone    5 mG/acetaminophen 325 mG 2 Tablet(s) Oral every 6 hours PRN Severe Pain (7 - 10)  oxycodone    5 mG/acetaminophen 325 mG 1 Tablet(s) Oral every 6 hours PRN Moderate Pain (4 - 6)  traZODone 50 milliGRAM(s) Oral daily PRN for insomnia        I&O's Summary    09 Apr 2024 07:01  -  10 Apr 2024 07:00  --------------------------------------------------------  IN: 0 mL / OUT: 1200 mL / NET: -1200 mL    10 Apr 2024 07:01  -  10 Apr 2024 17:41  --------------------------------------------------------  IN: 0 mL / OUT: 800 mL / NET: -800 mL        PHYSICAL EXAM:  Vital Signs Last 24 Hrs  T(C): 36.6 (10 Apr 2024 17:30), Max: 36.7 (09 Apr 2024 20:11)  T(F): 97.9 (10 Apr 2024 17:30), Max: 98.1 (09 Apr 2024 20:11)  HR: 65 (10 Apr 2024 17:30) (64 - 78)  BP: 139/82 (10 Apr 2024 17:30) (110/70 - 145/75)  BP(mean): 101 (10 Apr 2024 17:30) (82 - 101)  RR: 18 (10 Apr 2024 17:30) (18 - 20)  SpO2: 100% (10 Apr 2024 17:30) (96% - 100%)    Parameters below as of 10 Apr 2024 17:30  Patient On (Oxygen Delivery Method): nasal cannula  O2 Flow (L/min): 2      CONSTITUTIONAL: NAD, Not in acute distress  EYES:  EOMI, conjunctiva and sclera clear  ENMT: , neck supple , non-tender  RESPIRATORY: Normal respiratory effort; Bibasilar crackles  CARDIOVASCULAR: S1S2 present  ABDOMEN: soft. bowel sound present  MUSCLOSKELETAL: ; no clubbing or cyanosis of digits;   PSYCH: A+O to person, place, and time; affect appropriate  NEUROLOGY: CN, motor and sensory intact   SKIN: No rashes; no palpable lesions  Extremity: No bilateral edema      LABS:                        11.4   8.32  )-----------( 251      ( 10 Apr 2024 05:10 )             38.5     04-10    141  |  98  |  34.0<H>  ----------------------------<  139<H>  4.5   |  29.0  |  1.11    Ca    9.0      10 Apr 2024 05:10  Mg     2.1     04-10            Urinalysis Basic - ( 10 Apr 2024 05:10 )    Color: x / Appearance: x / SG: x / pH: x  Gluc: 139 mg/dL / Ketone: x  / Bili: x / Urobili: x   Blood: x / Protein: x / Nitrite: x   Leuk Esterase: x / RBC: x / WBC x   Sq Epi: x / Non Sq Epi: x / Bacteria: x        CAPILLARY BLOOD GLUCOSE      POCT Blood Glucose.: 130 mg/dL (10 Apr 2024 16:46)  POCT Blood Glucose.: 137 mg/dL (10 Apr 2024 12:39)  POCT Blood Glucose.: 222 mg/dL (10 Apr 2024 08:10)  POCT Blood Glucose.: 299 mg/dL (09 Apr 2024 21:31)      Labs reviewed:    RADIOLOGY & ADDITIONAL TESTS:  Imaging Personally Reviewed:  Electrocardiogram Personally Reviewed:  
[facility] Division of Hospital Medicine  Cielo Soto MD   I'm reachable on [employer] Teams      Patient is a 73y old  Female who presents with a chief complaint of Severe Sepsis/UTI/Elevated Troponin c/b Hypercapnic Resp Failure (06 Apr 2024 16:06)      SUBJECTIVE / OVERNIGHT EVENTS:   Patient was seen and examined during rounds    - She reports feeling better. Left flank pain is improving  -Urine culture grew aerococcus and proteous. Strarted on vancomycin and ceftriaxone. ID consulted  - ECho reported right heart failure with severe TR. Started on IV Furosemide  -PT eval pending  -Morning VBG reported improved Ph and PCo2. will continue Bipap    12 points ROS negative except above    MEDICATIONS  (STANDING):  atorvastatin 80 milliGRAM(s) Oral at bedtime  budesonide 160 MICROgram(s)/formoterol 4.5 MICROgram(s) Inhaler 2 Puff(s) Inhalation two times a day  dextrose 10% Bolus 125 milliLiter(s) IV Bolus once  dextrose 5%. 1000 milliLiter(s) (50 mL/Hr) IV Continuous <Continuous>  dextrose 5%. 1000 milliLiter(s) (100 mL/Hr) IV Continuous <Continuous>  dextrose 50% Injectable 12.5 Gram(s) IV Push once  dextrose 50% Injectable 25 Gram(s) IV Push once  enoxaparin Injectable 40 milliGRAM(s) SubCutaneous every 24 hours  folic acid 1 milliGRAM(s) Oral daily  furosemide   Injectable 40 milliGRAM(s) IV Push daily  gabapentin 100 milliGRAM(s) Oral two times a day  glucagon  Injectable 1 milliGRAM(s) IntraMuscular once  insulin glargine Injectable (LANTUS) 20 Unit(s) SubCutaneous every morning  insulin lispro (ADMELOG) corrective regimen sliding scale   SubCutaneous three times a day before meals  insulin lispro (ADMELOG) corrective regimen sliding scale   SubCutaneous at bedtime  levothyroxine 100 MICROGram(s) Oral daily  metoprolol succinate ER 25 milliGRAM(s) Oral daily  montelukast 10 milliGRAM(s) Oral daily  nystatin Powder 1 Application(s) Topical two times a day  pantoprazole    Tablet 40 milliGRAM(s) Oral before breakfast  vancomycin  IVPB 1500 milliGRAM(s) IV Intermittent every 24 hours    MEDICATIONS  (PRN):  acetaminophen     Tablet .. 650 milliGRAM(s) Oral every 6 hours PRN Temp greater or equal to 38C (100.4F), Mild Pain (1 - 3)  ALPRAZolam 0.25 milliGRAM(s) Oral daily PRN for anxiety  aluminum hydroxide/magnesium hydroxide/simethicone Suspension 30 milliLiter(s) Oral every 4 hours PRN Dyspepsia  dextrose Oral Gel 15 Gram(s) Oral once PRN Blood Glucose LESS THAN 70 milliGRAM(s)/deciliter  melatonin 3 milliGRAM(s) Oral at bedtime PRN Insomnia  ondansetron Injectable 4 milliGRAM(s) IV Push every 8 hours PRN Nausea and/or Vomiting  oxycodone    5 mG/acetaminophen 325 mG 2 Tablet(s) Oral every 6 hours PRN Severe Pain (7 - 10)  oxycodone    5 mG/acetaminophen 325 mG 1 Tablet(s) Oral every 6 hours PRN Moderate Pain (4 - 6)  traZODone 50 milliGRAM(s) Oral daily PRN for insomnia        I&O's Summary    06 Apr 2024 07:01  -  07 Apr 2024 07:00  --------------------------------------------------------  IN: 560 mL / OUT: 600 mL / NET: -40 mL    07 Apr 2024 07:01  -  07 Apr 2024 16:58  --------------------------------------------------------  IN: 480 mL / OUT: 1000 mL / NET: -520 mL        PHYSICAL EXAM:  Vital Signs Last 24 Hrs  T(C): 36.7 (07 Apr 2024 16:15), Max: 36.9 (07 Apr 2024 00:00)  T(F): 98.1 (07 Apr 2024 16:15), Max: 98.4 (07 Apr 2024 00:00)  HR: 69 (07 Apr 2024 16:15) (59 - 77)  BP: 133/60 (07 Apr 2024 16:15) (124/58 - 148/80)  BP(mean): --  RR: 18 (07 Apr 2024 16:15) (18 - 20)  SpO2: 94% (07 Apr 2024 16:15) (93% - 100%)    Parameters below as of 07 Apr 2024 16:15  Patient On (Oxygen Delivery Method): nasal cannula  O2 Flow (L/min): 2      CONSTITUTIONAL: NAD, Not in acute distress  EYES:  EOMI, conjunctiva and sclera clear  ENMT: , neck supple , non-tender  RESPIRATORY: Normal respiratory effort; lungs are clear to auscultation bilaterally  CARDIOVASCULAR: S1S2 present  ABDOMEN: soft. bowel sound present  MUSCLOSKELETAL: ; no clubbing or cyanosis of digits;   PSYCH: A+O to person, place, and time; affect appropriate  NEUROLOGY: CN, motor and sensory intact   SKIN: No rashes; no palpable lesions  Extremity: No bilateral edema      LABS:                        10.5   9.85  )-----------( 221      ( 07 Apr 2024 05:26 )             35.2     04-07    144  |  104  |  27.7<H>  ----------------------------<  135<H>  4.6   |  31.0<H>  |  1.22    Ca    8.7      07 Apr 2024 05:26  Mg     2.2     04-07    TPro  6.6  /  Alb  3.2<L>  /  TBili  0.3<L>  /  DBili  x   /  AST  9   /  ALT  8   /  AlkPhos  123<H>  04-06          Urinalysis Basic - ( 07 Apr 2024 05:26 )    Color: x / Appearance: x / SG: x / pH: x  Gluc: 135 mg/dL / Ketone: x  / Bili: x / Urobili: x   Blood: x / Protein: x / Nitrite: x   Leuk Esterase: x / RBC: x / WBC x   Sq Epi: x / Non Sq Epi: x / Bacteria: x        Culture - Urine (collected 05 Apr 2024 17:28)  Source: Clean Catch Clean Catch (Midstream)  Preliminary Report (07 Apr 2024 13:47):    50,000 - 99,000 CFU/mL Proteus mirabilis    50,000 - 99,000 CFU/mL Aerococcus urinae    Isolates of Aerococcus spp. are predictably susceptible to penicillin,    ampicillin, and vancomycin. All isolates are resistant to sulfonamides.    Culture - Blood (collected 05 Apr 2024 12:20)  Source: .Blood Blood-Peripheral  Preliminary Report (06 Apr 2024 20:01):    No growth at 24 hours    Culture - Blood (collected 05 Apr 2024 12:10)  Source: .Blood Blood-Peripheral  Preliminary Report (06 Apr 2024 20:01):    No growth at 24 hours      CAPILLARY BLOOD GLUCOSE      POCT Blood Glucose.: 172 mg/dL (07 Apr 2024 16:45)  POCT Blood Glucose.: 153 mg/dL (07 Apr 2024 13:00)  POCT Blood Glucose.: 128 mg/dL (07 Apr 2024 08:13)  POCT Blood Glucose.: 187 mg/dL (06 Apr 2024 22:02)  POCT Blood Glucose.: 226 mg/dL (06 Apr 2024 17:45)      Labs reviewed:    RADIOLOGY & ADDITIONAL TESTS:  Imaging Personally Reviewed:  Electrocardiogram Personally Reviewed:

## 2024-04-10 NOTE — ASU PREOP CHECKLIST - [id number]_A_CORE_SIUH
Caller Name: rupesh wilson  Call Back Number: [phone]        Pt called in to see if her results were in from a vag path done last week, upon checking chart it looks like Dr Verdin reviewed results and they were negative, pt advised and had no further questions at this time   no...

## 2024-04-10 NOTE — PROGRESS NOTE ADULT - ASSESSMENT
73F with PMHx Chronic lower back pain secondary to Spinal stenosis and Sciatica, Neuropathy, Recurrent UTIs, DM, HFpEF, CAD s/p CABGx4 (2019) and CardioMEMS, ROMANA on CPAP, Hypothyroidism, Carpal tunnel syndrome presents to the ED with complaint of L flank pain and dysuria. Associated shortness of breath. CT showing elevated Trops, positive UA, CT scan with right adnexal mass.    PLAN:  # Recurrent UTI  - UA positive  - CT negative for pyelo, showing right adnexal mass  - F/u BCx , UCx  - Continue Ceftriaxone  - Follow up cultures  - Abfebrile  - Leukocytosis 13.74 on admission, 11.35 on repeat  - Trend WBC    # CAD w/ elevated Troponin  # HFpEF  - Troponin 70 on admission, 62 on repeat  - Xray suggestive of heart enlargement  - No evidence of volume overload  - TTE ordered to assess LV function  - Seen by cardiology, recs appreciated  - Cardio to evaluate CardioMEMS  - Continue Metoprolol, Spironolactone, Torsemide    # Acute hypoxic Hypercapnic respiratory failure   # Obstructive sleep apnea  # Pulm HTN  - Noncompliance with CPAP  - Lactate elevated in the ED normalized s/p IV fluid bolus  - Supplemental O2 wean as tolerated  - Hypercapnia on admission  - Nocturnal BIPAP  - Repeat ABG post BIPAP  - Symbicort, Montelukast     # Right adnexal mass  - Pelvic ultrasound    # Chronic back pain  # Spinal stenosis  # Sciatica  - Continue Gabapentin  - Continue Percocet    # Diabetes  - HbA1c  - Takes Lantus 45 at home, Will order Lantus 20 Units daily  - ISS moderate scale  - Carb consistent diet  - Hold Metformin    # HLD  - Continue Atorvastatin    # Hypothyroidism  - Continue Levothyroxine    # Anxiety   - PRN Xanax    # Rash  - Nystatin powder    - Fall precautions given previous hx of fall    VTE ppx: Lovenox  Diet: DASH/TLC, Consistent carb  
73F with PMHx Chronic lower back pain secondary to Spinal stenosis and Sciatica, Neuropathy, Recurrent UTIs, DM, HFpEF, CAD s/p CABGx4 (2019) and CardioMEMS, ROMANA on CPAP, Hypothyroidism, Carpal tunnel syndrome presents to the ED with complaint of L flank pain and dysuria. Associated shortness of breath. CT showing elevated Trops, positive UA, CT scan with right adnexal mass.    PLAN:  # Recurrent UTI  - UA positive. Urine culture grew aerococcus and proteous  -Continue Ceftriaxone (D1 on 04/05/2024). Plan to continue 04/11  - CT negative for pyelo, showing right adnexal mass  - ID consulted    # Endometritis  -US pelvis concerning for endometritis  -GYN consulted  -Plan for transvaginal US    # CAD w/ elevated Troponin  # HFpEF  - Troponin 70 on admission, 62 on repeat  - Xray suggestive of heart enlargement  - - TTE -reported right heart failure with severe TR  -On torasemide BID  - Seen by cardiology, recs appreciated  - Cardio to evaluate CardioMEMS  - Continue Metoprolol, Spironolactone,     # Acute hypoxic Hypercapnic respiratory failure   # Obstructive sleep apnea  # Pulm HTN  - Noncompliance with CPAP.   - - Supplemental O2 wean as tolerated  - Hypercapnia on admission  - Nocturnal BIPAP  - Symbicort, Montelukast     # Right adnexal mass  - Pelvic ultrasound    # Chronic back pain  # Spinal stenosis  # Sciatica  - Continue Gabapentin  - Continue Percocet    # Diabetes  - HbA1c  - Takes Lantus 45 at home, Will order Lantus 20 Units daily  - ISS moderate scale  - Carb consistent diet  - Hold Metformin    # HLD  - Continue Atorvastatin    # Hypothyroidism  - Continue Levothyroxine    # Anxiety   - PRN Xanax    # Rash  - Nystatin powder    - Fall precautions given previous hx of fall    VTE ppx: Lovenox  Diet: DASH/TLC, Consistent carb  
73F with PMHx Chronic lower back pain secondary to Spinal stenosis and Sciatica, Neuropathy, Recurrent UTIs, DM, HFpEF, CAD s/p CABGx4 (2019) and CardioMEMS, ROMANA on CPAP, Hypothyroidism, Carpal tunnel syndrome presents to the ED with complaint of L flank pain and dysuria. Associated shortness of breath. CT showing elevated Trops, positive UA, CT scan with right adnexal mass.    PLAN:  # Recurrent UTI  - UA positive. Urine culture grew aerococcus and proteous  -Continue Ceftriaxone (D1 on 04/05/2024). Plan to continue 04/11/2024  - CT negative for pyelo, showing right adnexal mass  - ID consulted    # Endometritis  # Right Adenexal mass  -US pelvis concerning for endometritis  -Vaginal US reported Abnormal heterogenous and thickened endometrium again demonstrated. 3.3 cm in greatest dimension exophytic lesion off the right/anterior . lower uterine segment most likely a fibroid.  -GYN will discuss with patient and family about biopsy plan inpatient vs outpatient    # Acute hypoxic Hypercapnic respiratory failure   # Obstructive sleep apnea  # Pulm HTN  - Noncompliance with CPAP.   - - Supplemental O2 wean as tolerated  - Hypercapnia on admission. Garo's  reported patient has bipap with real time monitoring by pulmonologist  -Continue nocturnal Bipap   - Symbicort, Montelukast       # CAD w/ elevated Troponin  # HFpEF  - Troponin 70 on admission, 62 on repeat  - Xray suggestive of heart enlargement  - - TTE -reported right heart failure with severe TR  -On torasemide BID  - Seen by cardiology, recs appreciated  - Cardio to evaluate CardioMEMS  - Continue Metoprolol, Spironolactone,       # Right adnexal mass  - Pelvic ultrasound    # Chronic back pain  # Spinal stenosis  # Sciatica  - Continue Gabapentin  - Continue Percocet    # Diabetes  - HbA1c  - Takes Lantus 45 at home, Will order Lantus 20 Units daily  - ISS moderate scale  - Carb consistent diet  - Hold Metformin    # HLD  - Continue Atorvastatin    # Hypothyroidism  - Continue Levothyroxine    # Anxiety   - PRN Xanax    # Rash  - Nystatin powder    - Fall precautions given previous hx of fall    VTE ppx: Lovenox  Diet: DASH/TLC, Consistent carb  
73F with PMHx Chronic lower back pain secondary to Spinal stenosis and Sciatica, Neuropathy, Recurrent UTIs, DM, HFpEF, CAD s/p CABGx4 (2019) and CardioMEMS, ROMANA on CPAP, Hypothyroidism, Carpal tunnel syndrome presents to the ED with complaint of L flank pain and dysuria. Associated shortness of breath. CT showing elevated Trops, positive UA, CT scan with right adnexal mass.    PLAN:  # Recurrent UTI  - UA positive. Urine culture grew aerococcus and proteous  -Continue Ceftriaxone (D1 on 04/05/2024). Plan to continue 04/11/2024  - CT negative for pyelo, showing right adnexal mass  - ID consulted    # Endometritis  # Right Adenexal mass  -US pelvis concerning for endometritis  -Vaginal US reported Abnormal heterogenous and thickened endometrium again demonstrated. 3.3 cm in greatest dimension exophytic lesion off the right/anterior . lower uterine segment most likely a fibroid.  -Plan for OR D&C on 04/10/2024    # Acute hypoxic Hypercapnic respiratory failure   # Obstructive sleep apnea  # Pulm HTN  - Noncompliance with CPAP.   - - Supplemental O2 wean as tolerated  - Hypercapnia on admission. Garo's  reported patient has bipap with real time monitoring by pulmonologist  -Continue nocturnal Bipap   - Symbicort, Montelukast       # CAD w/ elevated Troponin  # HFpEF  - Troponin 70 on admission, 62 on repeat  - Xray suggestive of heart enlargement  - - TTE -reported right heart failure with severe TR  -On torasemide BID  - Seen by cardiology, recs appreciated  - Cardio to evaluate CardioMEMS  - Continue Metoprolol, Spironolactone,       # Right adnexal mass  - Pelvic ultrasound    # Chronic back pain  # Spinal stenosis  # Sciatica  - Continue Gabapentin  - Continue Percocet    # Diabetes  - HbA1c  - Takes Lantus 45 at home, Will order Lantus 20 Units daily  - ISS moderate scale  - Carb consistent diet  - Hold Metformin    # HLD  - Continue Atorvastatin    # Hypothyroidism  - Continue Levothyroxine    # Anxiety   - PRN Xanax    # Rash  - Nystatin powder    - Fall precautions given previous hx of fall    VTE ppx: Lovenox  Diet: DASH/TLC, Consistent carb  
73F with PMHx Chronic lower back pain secondary to Spinal stenosis and Sciatica, Neuropathy, Recurrent UTIs, DM, HFpEF, CAD s/p CABGx4 (2019) and CardioMEMS, ROMANA on CPAP, Hypothyroidism, Carpal tunnel syndrome presents to the ED with complaint of L flank pain and dysuria. Associated shortness of breath. CT showing elevated Trops, positive UA, CT scan with right adnexal mass.    PLAN:  # Recurrent UTI  - UA positive. Urine culture grew aerococcus and proteous  - Started on vancomycin(D1 on 04/07/2024) and ceftriaxone (D1 on 04/05/2024)  - CT negative for pyelo, showing right adnexal mass  - ID consulted    # CAD w/ elevated Troponin  # HFpEF  - Troponin 70 on admission, 62 on repeat  - Xray suggestive of heart enlargement  - - TTE -reported right heart failure with severe TR  -Started on IV furosemide  - Seen by cardiology, recs appreciated  - Cardio to evaluate CardioMEMS  - Continue Metoprolol, Spironolactone,     # Acute hypoxic Hypercapnic respiratory failure   # Obstructive sleep apnea  # Pulm HTN  - Noncompliance with CPAP.   - - Supplemental O2 wean as tolerated  - Hypercapnia on admission  - Nocturnal BIPAP  - Symbicort, Montelukast     # Right adnexal mass  - Pelvic ultrasound    # Chronic back pain  # Spinal stenosis  # Sciatica  - Continue Gabapentin  - Continue Percocet    # Diabetes  - HbA1c  - Takes Lantus 45 at home, Will order Lantus 20 Units daily  - ISS moderate scale  - Carb consistent diet  - Hold Metformin    # HLD  - Continue Atorvastatin    # Hypothyroidism  - Continue Levothyroxine    # Anxiety   - PRN Xanax    # Rash  - Nystatin powder    - Fall precautions given previous hx of fall    VTE ppx: Lovenox  Diet: DASH/TLC, Consistent carb  
Toileting

## 2024-04-11 ENCOUNTER — TRANSCRIPTION ENCOUNTER (OUTPATIENT)
Age: 74
End: 2024-04-11

## 2024-04-11 VITALS
DIASTOLIC BLOOD PRESSURE: 79 MMHG | RESPIRATION RATE: 18 BRPM | TEMPERATURE: 99 F | HEART RATE: 69 BPM | OXYGEN SATURATION: 98 % | SYSTOLIC BLOOD PRESSURE: 129 MMHG

## 2024-04-11 LAB
ANION GAP SERPL CALC-SCNC: 9 MMOL/L — SIGNIFICANT CHANGE UP (ref 5–17)
BUN SERPL-MCNC: 28.3 MG/DL — HIGH (ref 8–20)
CALCIUM SERPL-MCNC: 9.4 MG/DL — SIGNIFICANT CHANGE UP (ref 8.4–10.5)
CHLORIDE SERPL-SCNC: 98 MMOL/L — SIGNIFICANT CHANGE UP (ref 96–108)
CO2 SERPL-SCNC: 36 MMOL/L — HIGH (ref 22–29)
CREAT SERPL-MCNC: 1.18 MG/DL — SIGNIFICANT CHANGE UP (ref 0.5–1.3)
EGFR: 48 ML/MIN/1.73M2 — LOW
GLUCOSE BLDC GLUCOMTR-MCNC: 176 MG/DL — HIGH (ref 70–99)
GLUCOSE BLDC GLUCOMTR-MCNC: 187 MG/DL — HIGH (ref 70–99)
GLUCOSE SERPL-MCNC: 169 MG/DL — HIGH (ref 70–99)
HCT VFR BLD CALC: 41.2 % — SIGNIFICANT CHANGE UP (ref 34.5–45)
HGB BLD-MCNC: 12.3 G/DL — SIGNIFICANT CHANGE UP (ref 11.5–15.5)
MAGNESIUM SERPL-MCNC: 1.8 MG/DL — SIGNIFICANT CHANGE UP (ref 1.8–2.6)
MCHC RBC-ENTMCNC: 28.2 PG — SIGNIFICANT CHANGE UP (ref 27–34)
MCHC RBC-ENTMCNC: 29.9 GM/DL — LOW (ref 32–36)
MCV RBC AUTO: 94.5 FL — SIGNIFICANT CHANGE UP (ref 80–100)
PLATELET # BLD AUTO: 260 K/UL — SIGNIFICANT CHANGE UP (ref 150–400)
POTASSIUM SERPL-MCNC: 4.2 MMOL/L — SIGNIFICANT CHANGE UP (ref 3.5–5.3)
POTASSIUM SERPL-SCNC: 4.2 MMOL/L — SIGNIFICANT CHANGE UP (ref 3.5–5.3)
RBC # BLD: 4.36 M/UL — SIGNIFICANT CHANGE UP (ref 3.8–5.2)
RBC # FLD: 15.6 % — HIGH (ref 10.3–14.5)
SODIUM SERPL-SCNC: 143 MMOL/L — SIGNIFICANT CHANGE UP (ref 135–145)
WBC # BLD: 10.75 K/UL — HIGH (ref 3.8–10.5)
WBC # FLD AUTO: 10.75 K/UL — HIGH (ref 3.8–10.5)

## 2024-04-11 PROCEDURE — 83735 ASSAY OF MAGNESIUM: CPT

## 2024-04-11 PROCEDURE — 84132 ASSAY OF SERUM POTASSIUM: CPT

## 2024-04-11 PROCEDURE — 76830 TRANSVAGINAL US NON-OB: CPT

## 2024-04-11 PROCEDURE — 88305 TISSUE EXAM BY PATHOLOGIST: CPT

## 2024-04-11 PROCEDURE — 82803 BLOOD GASES ANY COMBINATION: CPT

## 2024-04-11 PROCEDURE — 82330 ASSAY OF CALCIUM: CPT

## 2024-04-11 PROCEDURE — 86803 HEPATITIS C AB TEST: CPT

## 2024-04-11 PROCEDURE — 93005 ELECTROCARDIOGRAM TRACING: CPT

## 2024-04-11 PROCEDURE — 96374 THER/PROPH/DIAG INJ IV PUSH: CPT

## 2024-04-11 PROCEDURE — 85730 THROMBOPLASTIN TIME PARTIAL: CPT

## 2024-04-11 PROCEDURE — 76856 US EXAM PELVIC COMPLETE: CPT

## 2024-04-11 PROCEDURE — 87637 SARSCOV2&INF A&B&RSV AMP PRB: CPT

## 2024-04-11 PROCEDURE — 84484 ASSAY OF TROPONIN QUANT: CPT

## 2024-04-11 PROCEDURE — 71045 X-RAY EXAM CHEST 1 VIEW: CPT

## 2024-04-11 PROCEDURE — 94660 CPAP INITIATION&MGMT: CPT

## 2024-04-11 PROCEDURE — 83880 ASSAY OF NATRIURETIC PEPTIDE: CPT

## 2024-04-11 PROCEDURE — 85018 HEMOGLOBIN: CPT

## 2024-04-11 PROCEDURE — 36415 COLL VENOUS BLD VENIPUNCTURE: CPT

## 2024-04-11 PROCEDURE — 87040 BLOOD CULTURE FOR BACTERIA: CPT

## 2024-04-11 PROCEDURE — 82962 GLUCOSE BLOOD TEST: CPT

## 2024-04-11 PROCEDURE — 84295 ASSAY OF SERUM SODIUM: CPT

## 2024-04-11 PROCEDURE — 99239 HOSP IP/OBS DSCHRG MGMT >30: CPT

## 2024-04-11 PROCEDURE — 88342 IMHCHEM/IMCYTCHM 1ST ANTB: CPT

## 2024-04-11 PROCEDURE — 93306 TTE W/DOPPLER COMPLETE: CPT

## 2024-04-11 PROCEDURE — 94760 N-INVAS EAR/PLS OXIMETRY 1: CPT

## 2024-04-11 PROCEDURE — 87186 SC STD MICRODIL/AGAR DIL: CPT

## 2024-04-11 PROCEDURE — 97163 PT EVAL HIGH COMPLEX 45 MIN: CPT

## 2024-04-11 PROCEDURE — 99285 EMERGENCY DEPT VISIT HI MDM: CPT | Mod: 25

## 2024-04-11 PROCEDURE — 88360 TUMOR IMMUNOHISTOCHEM/MANUAL: CPT

## 2024-04-11 PROCEDURE — 85025 COMPLETE CBC W/AUTO DIFF WBC: CPT

## 2024-04-11 PROCEDURE — 87077 CULTURE AEROBIC IDENTIFY: CPT

## 2024-04-11 PROCEDURE — 85027 COMPLETE CBC AUTOMATED: CPT

## 2024-04-11 PROCEDURE — 85014 HEMATOCRIT: CPT

## 2024-04-11 PROCEDURE — 87086 URINE CULTURE/COLONY COUNT: CPT

## 2024-04-11 PROCEDURE — 80053 COMPREHEN METABOLIC PANEL: CPT

## 2024-04-11 PROCEDURE — 83605 ASSAY OF LACTIC ACID: CPT

## 2024-04-11 PROCEDURE — 94640 AIRWAY INHALATION TREATMENT: CPT

## 2024-04-11 PROCEDURE — 83036 HEMOGLOBIN GLYCOSYLATED A1C: CPT

## 2024-04-11 PROCEDURE — 81001 URINALYSIS AUTO W/SCOPE: CPT

## 2024-04-11 PROCEDURE — 82947 ASSAY GLUCOSE BLOOD QUANT: CPT

## 2024-04-11 PROCEDURE — 82435 ASSAY OF BLOOD CHLORIDE: CPT

## 2024-04-11 PROCEDURE — 88341 IMHCHEM/IMCYTCHM EA ADD ANTB: CPT

## 2024-04-11 PROCEDURE — 71275 CT ANGIOGRAPHY CHEST: CPT | Mod: MC

## 2024-04-11 PROCEDURE — 74177 CT ABD & PELVIS W/CONTRAST: CPT | Mod: MC

## 2024-04-11 PROCEDURE — 80048 BASIC METABOLIC PNL TOTAL CA: CPT

## 2024-04-11 PROCEDURE — 85610 PROTHROMBIN TIME: CPT

## 2024-04-11 RX ORDER — OXYCODONE AND ACETAMINOPHEN 5; 325 MG/1; MG/1
1 TABLET ORAL
Qty: 5 | Refills: 0
Start: 2024-04-11 | End: 2024-04-15

## 2024-04-11 RX ORDER — OXYCODONE AND ACETAMINOPHEN 5; 325 MG/1; MG/1
1 TABLET ORAL
Refills: 0 | DISCHARGE

## 2024-04-11 RX ADMIN — Medication 20 MILLIGRAM(S): at 13:41

## 2024-04-11 RX ADMIN — NYSTATIN CREAM 1 APPLICATION(S): 100000 CREAM TOPICAL at 06:13

## 2024-04-11 RX ADMIN — Medication 25 MILLIGRAM(S): at 06:12

## 2024-04-11 RX ADMIN — Medication 2: at 08:35

## 2024-04-11 RX ADMIN — PANTOPRAZOLE SODIUM 40 MILLIGRAM(S): 20 TABLET, DELAYED RELEASE ORAL at 06:13

## 2024-04-11 RX ADMIN — Medication 2: at 11:57

## 2024-04-11 RX ADMIN — Medication 1 MILLIGRAM(S): at 08:26

## 2024-04-11 RX ADMIN — INSULIN GLARGINE 20 UNIT(S): 100 INJECTION, SOLUTION SUBCUTANEOUS at 08:25

## 2024-04-11 RX ADMIN — CEFTRIAXONE 1000 MILLIGRAM(S): 500 INJECTION, POWDER, FOR SOLUTION INTRAMUSCULAR; INTRAVENOUS at 08:27

## 2024-04-11 RX ADMIN — BUDESONIDE AND FORMOTEROL FUMARATE DIHYDRATE 2 PUFF(S): 160; 4.5 AEROSOL RESPIRATORY (INHALATION) at 09:31

## 2024-04-11 RX ADMIN — MONTELUKAST 10 MILLIGRAM(S): 4 TABLET, CHEWABLE ORAL at 08:26

## 2024-04-11 RX ADMIN — Medication 100 MICROGRAM(S): at 06:12

## 2024-04-11 RX ADMIN — Medication 20 MILLIGRAM(S): at 06:12

## 2024-04-11 RX ADMIN — GABAPENTIN 100 MILLIGRAM(S): 400 CAPSULE ORAL at 06:15

## 2024-04-11 NOTE — DISCHARGE NOTE PROVIDER - ATTENDING DISCHARGE PHYSICAL EXAMINATION:
Vital Signs Last 24 Hrs  T(C): 36.5 (11 Apr 2024 08:55), Max: 36.7 (11 Apr 2024 05:00)  T(F): 97.7 (11 Apr 2024 08:55), Max: 98 (11 Apr 2024 05:00)  HR: 73 (11 Apr 2024 08:55) (64 - 82)  BP: 115/73 (11 Apr 2024 08:55) (111/67 - 167/78)  BP(mean): 87 (10 Apr 2024 20:00) (82 - 101)  RR: 18 (11 Apr 2024 08:55) (10 - 21)  SpO2: 98% (11 Apr 2024 08:55) (96% - 100%)    Parameters below as of 11 Apr 2024 09:00  Patient On (Oxygen Delivery Method): nasal cannula      CONSTITUTIONAL: NAD, Not in acute distress  EYES:  EOMI, conjunctiva and sclera clear  ENMT: , neck supple , non-tender  RESPIRATORY: Normal respiratory effort; Bibasilar crackles improved from yesterday  CARDIOVASCULAR: S1S2 present  ABDOMEN: soft. bowel sound present. Right lower quadrant tenderness  MUSCLOSKELETAL: ; no clubbing or cyanosis of digits;   PSYCH: A+O to person, place, and time; affect appropriate  NEUROLOGY: CN, motor and sensory intact   SKIN: No rashes; no palpable lesions  Extremity: No bilateral edema

## 2024-04-11 NOTE — PHYSICAL THERAPY INITIAL EVALUATION ADULT - PLANNED THERAPY INTERVENTIONS, PT EVAL
balance training/bed mobility training/gait training/transfer training
stairs/bed mobility training/gait training/transfer training

## 2024-04-11 NOTE — DISCHARGE NOTE PROVIDER - CARE PROVIDER_API CALL
Didi Baires  Obstetrics and Gynecology  [address] [place] [id number]  Phone: [phone]  Fax: [phone]  Follow Up Time:

## 2024-04-11 NOTE — DISCHARGE NOTE PROVIDER - NSDCCPCAREPLAN_GEN_ALL_CORE_FT
PRINCIPAL DISCHARGE DIAGNOSIS  Diagnosis: Acute UTI  Assessment and Plan of Treatment: completed antibiotics      SECONDARY DISCHARGE DIAGNOSES  Diagnosis: Acute respiratory failure with hypoxia  Assessment and Plan of Treatment:     Diagnosis: Adnexal tenderness, right  Assessment and Plan of Treatment: Follow up with gynecologist

## 2024-04-11 NOTE — DISCHARGE NOTE PROVIDER - NSDCMRMEDTOKEN_GEN_ALL_CORE_FT
atorvastatin 80 mg oral tablet: 1 tab(s) orally once a day (at bedtime)  betamethasone dipropionate 0.05% topical cream: Apply topically to affected area 2 times a day To affected area  budesonide/glycopyrrolate/formoterol 160 mcg-9 mcg-4.8 mcg/inh inhalation aerosol: 2 puff(s) inhaled in the morning and at bedtime  Compazine 5 mg oral tablet: 1 tab(s) orally every 8 hours as needed for  nausea  diclofenac 1% topical gel: Apply topically to affected area 4 times a day  ezetimibe 10 mg oral tablet: 1 tab(s) orally once a day  folic acid 1 mg oral tablet: 1 tab(s) orally once a day  gabapentin 300 mg oral capsule: 1 cap(s) orally 3 times a day  Lantus Solostar Pen 100 units/mL subcutaneous solution: 45 unit(s) subcutaneous once a day  levothyroxine 100 mcg (0.1 mg) oral tablet: 1 tab(s) orally once a day  metFORMIN 500 mg oral tablet: 1 tab(s) orally 2 times a day  metoprolol succinate 25 mg oral tablet, extended release: 1 tab(s) orally once a day  omeprazole 40 mg oral delayed release capsule: 1 cap(s) orally once a day  oxycodone-acetaminophen 10 mg-325 mg oral tablet: 1 tab(s) orally every 6 hours as needed for  severe pain  Singulair 10 mg oral tablet: 1 tab(s) orally once a day  spironolactone 25 mg oral tablet: 1 tab(s) orally once a day  tiZANidine 2 mg oral tablet: 1 tab(s) orally once a day (at bedtime) as needed for tremor  torsemide 20 mg oral tablet: 1 tab(s) orally 3 times a day  traZODone 50 mg oral tablet: 1 tab(s) orally once a day as needed for  insomnia  Xanax 0.25 mg oral tablet: 1 tab(s) orally once a day as needed for  anxiety   atorvastatin 80 mg oral tablet: 1 tab(s) orally once a day (at bedtime)  betamethasone dipropionate 0.05% topical cream: Apply topically to affected area 2 times a day To affected area  budesonide/glycopyrrolate/formoterol 160 mcg-9 mcg-4.8 mcg/inh inhalation aerosol: 2 puff(s) inhaled in the morning and at bedtime  Compazine 5 mg oral tablet: 1 tab(s) orally every 8 hours as needed for  nausea  diclofenac 1% topical gel: Apply topically to affected area 4 times a day  ezetimibe 10 mg oral tablet: 1 tab(s) orally once a day  folic acid 1 mg oral tablet: 1 tab(s) orally once a day  gabapentin 300 mg oral capsule: 1 cap(s) orally 3 times a day  Lantus Solostar Pen 100 units/mL subcutaneous solution: 45 unit(s) subcutaneous once a day  levothyroxine 100 mcg (0.1 mg) oral tablet: 1 tab(s) orally once a day  metFORMIN 500 mg oral tablet: 1 tab(s) orally 2 times a day  metoprolol succinate 25 mg oral tablet, extended release: 1 tab(s) orally once a day  omeprazole 40 mg oral delayed release capsule: 1 cap(s) orally once a day  oxycodone-acetaminophen 10 mg-325 mg oral tablet: 1 tab(s) orally every 6 hours as needed for  severe pain MDD: 20  Singulair 10 mg oral tablet: 1 tab(s) orally once a day  spironolactone 25 mg oral tablet: 1 tab(s) orally once a day  tiZANidine 2 mg oral tablet: 1 tab(s) orally once a day (at bedtime) as needed for tremor  torsemide 20 mg oral tablet: 1 tab(s) orally 3 times a day  traZODone 50 mg oral tablet: 1 tab(s) orally once a day as needed for  insomnia  Xanax 0.25 mg oral tablet: 1 tab(s) orally once a day as needed for  anxiety

## 2024-04-11 NOTE — PHYSICAL THERAPY INITIAL EVALUATION ADULT - ADDITIONAL COMMENTS
Pt reports living with spouse in a house with 3-4 TIFFANY c rail, and all needs met on 1st floor. Pt amb with RW and is independent with functional mobility and has assist as needed for ADLs, and IADLs. Pt does not drive and is retired. Pt has support of spouse. Pt owns RW and home O2 at 3L/min.
Pt reports living with spouse in a house with 3-4 TIFF[address] c rail, and all needs met on 1st floor. Pt amb with RW and is independent with functional mobility and has assist as needed for ADLs, and IADLs. Pt does not drive and is retired. Pt has support of spouse. Pt owns RW and home O2 at 3L/min.

## 2024-04-11 NOTE — PHYSICAL THERAPY INITIAL EVALUATION ADULT - CRITERIA FOR SKILLED THERAPEUTIC INTERVENTIONS
impairments found/functional limitations in following categories/risk reduction/prevention/rehab potential/therapy frequency/anticipated equipment needs at discharge/anticipated discharge recommendation
impairments found/functional limitations in following categories

## 2024-04-11 NOTE — PHYSICAL THERAPY INITIAL EVALUATION ADULT - GROSSLY INTACT, SENSORY
She is overdue for follow-up as Dr. Ball Class wanted to see her back in one month after her last OV 9/5/17.
Left UE/Right UE/Left LE/Right LE/Grossly Intact
Grossly Intact

## 2024-04-11 NOTE — PHYSICAL THERAPY INITIAL EVALUATION ADULT - PERTINENT HX OF CURRENT PROBLEM, REHAB EVAL
73F with PMHx Chronic lower back pain secondary to Spinal stenosis and Sciatica, Neuropathy, Recurrent UTIs, DM, HFpEF, CAD s/p CABGx4 (2019) and CardioMEMS, ROMANA on CPAP, Hypothyroidism, Carpal tunnel syndrome presents to the ED with complaint of L flank pain and dysuria. Associated shortness of breath. CT showing elevated Trops, positive UA, CT scan with right adnexal mass.

## 2024-04-11 NOTE — PHYSICAL THERAPY INITIAL EVALUATION ADULT - GENERAL OBSERVATIONS, REHAB EVAL
awake in chair on 2L/min O2 via nc, + via telemonitor. room air for amb per nurse and check spo2.
Pt received in bed +IV +monitor +primafit +O2 via NC with spouse at bedside, pleasant and cooperative

## 2024-04-11 NOTE — DISCHARGE NOTE PROVIDER - NSDCHHHOMEBOUND_GEN_ALL_CORE
Ataxic gait/Requires supervison due to deteriorating mental status.../Shortness of breath with minimal ambulation

## 2024-04-11 NOTE — DISCHARGE NOTE PROVIDER - NSDCFUADDINST_GEN_ALL_CORE_FT
Very important that gyn care is established to follow-up endometrial biopsy results. please call the office of Dr. Didi Baires to schedule a follow up appointment in 1-2 weeks.

## 2024-04-11 NOTE — DISCHARGE NOTE PROVIDER - HOSPITAL COURSE
73F with PMHx Chronic lower back pain secondary to Spinal stenosis and Sciatica, Neuropathy, Recurrent UTIs, DM, HFpEF, CAD s/p CABGx4 (2019) and CardioMEMS, ROMANA on CPAP, Hypothyroidism, Carpal tunnel syndrome presents to the ED with complaint of L flank pain and dysuria. Associated shortness of breath. CT showing elevated Trops, positive UA, CT scan with right adnexal mass.    PLAN:  # Recurrent UTI  - UA positive. Urine culture grew aerococcus and proteous  -Continue Ceftriaxone (D1 on 04/05/2024). Completed Abx on 04/11/2024  - CT negative for pyelo, showing right adnexal mass  - ID consulted    # Endometritis  # Right Adenexal mass  -US pelvis concerning for endometritis  -Vaginal US reported Abnormal heterogenous and thickened endometrium again demonstrated. 3.3 cm in greatest dimension exophytic lesion off the right/anterior . lower uterine segment most likely a fibroid.  - S/p OR D&C on 04/10/2024- Follow with GYN outpatient    # Acute hypoxic Hypercapnic respiratory failure   # Obstructive sleep apnea  # Pulm HTN  - Noncompliance with CPAP.   - - Supplemental O2 wean as tolerated  - Hypercapnia on admission. Garo's  reported patient has bipap with real time monitoring by pulmonologist  -Continue nocturnal Bipap.  - Symbicort, Montelukast   -Follow up with pulmonologist    # CAD w/ elevated Troponin  # HFpEF  - Troponin 70 on admission, 62 on repeat  - Xray suggestive of heart enlargement  - - TTE -reported right heart failure with severe TR  -On torasemide BID  - Seen by cardiology, recs appreciated. Follow up outpatient  - Cardio to evaluate CardioMEMS  - Continue Metoprolol, Spironolactone,       # Right adnexal mass  - Pelvic ultrasound    # Chronic back pain  # Spinal stenosis  # Sciatica  - Continue Gabapentin  - Continue Percocet    # Diabetes  - HbA1c  - Takes Lantus 45 at home, Will order Lantus 20 Units daily  - ISS moderate scale  - Carb consistent diet  - Hold Metformin    # HLD  - Continue Atorvastatin    # Hypothyroidism  - Continue Levothyroxine    # Anxiety   - PRN Xanax    # Rash  - Nystatin powder    - Fall precautions given previous hx of fall

## 2024-04-19 PROBLEM — G47.33 OBSTRUCTIVE SLEEP APNEA (ADULT) (PEDIATRIC): Chronic | Status: ACTIVE | Noted: 2024-04-05

## 2024-04-19 PROBLEM — M48.00 SPINAL STENOSIS, SITE UNSPECIFIED: Chronic | Status: ACTIVE | Noted: 2024-04-05

## 2024-04-19 PROBLEM — R25.1 TREMOR, UNSPECIFIED: Chronic | Status: ACTIVE | Noted: 2024-04-05

## 2024-04-19 PROBLEM — I25.10 ATHEROSCLEROTIC HEART DISEASE OF NATIVE CORONARY ARTERY WITHOUT ANGINA PECTORIS: Chronic | Status: ACTIVE | Noted: 2024-04-05

## 2024-04-19 PROBLEM — E03.9 HYPOTHYROIDISM, UNSPECIFIED: Chronic | Status: ACTIVE | Noted: 2024-04-05

## 2024-04-19 PROBLEM — N39.0 URINARY TRACT INFECTION, SITE NOT SPECIFIED: Chronic | Status: ACTIVE | Noted: 2024-04-05

## 2024-04-19 PROBLEM — M54.40 LUMBAGO WITH SCIATICA, UNSPECIFIED SIDE: Chronic | Status: ACTIVE | Noted: 2024-04-05

## 2024-04-19 PROBLEM — E11.9 TYPE 2 DIABETES MELLITUS WITHOUT COMPLICATIONS: Chronic | Status: ACTIVE | Noted: 2024-04-05

## 2024-04-19 PROBLEM — G62.9 POLYNEUROPATHY, UNSPECIFIED: Chronic | Status: ACTIVE | Noted: 2024-04-05

## 2024-04-29 LAB — SURGICAL PATHOLOGY STUDY: SIGNIFICANT CHANGE UP

## 2024-05-01 ENCOUNTER — APPOINTMENT (OUTPATIENT)
Dept: GYNECOLOGIC ONCOLOGY | Facility: CLINIC | Age: 74
End: 2024-05-01
Payer: MEDICARE

## 2024-05-01 VITALS
OXYGEN SATURATION: 88 % | DIASTOLIC BLOOD PRESSURE: 71 MMHG | RESPIRATION RATE: 16 BRPM | WEIGHT: 218 LBS | HEIGHT: 65 IN | HEART RATE: 81 BPM | BODY MASS INDEX: 36.32 KG/M2 | SYSTOLIC BLOOD PRESSURE: 130 MMHG

## 2024-05-01 DIAGNOSIS — M48.00 SPINAL STENOSIS, SITE UNSPECIFIED: ICD-10-CM

## 2024-05-01 DIAGNOSIS — E03.9 HYPOTHYROIDISM, UNSPECIFIED: ICD-10-CM

## 2024-05-01 PROCEDURE — 99204 OFFICE O/P NEW MOD 45 MIN: CPT

## 2024-05-01 RX ORDER — SPIRONOLACTONE 25 MG/1
25 TABLET ORAL
Qty: 90 | Refills: 0 | Status: ACTIVE | COMMUNITY
Start: 2023-12-22

## 2024-05-01 RX ORDER — TRAZODONE HYDROCHLORIDE 50 MG/1
50 TABLET ORAL
Qty: 60 | Refills: 0 | Status: ACTIVE | COMMUNITY
Start: 2024-04-16

## 2024-05-01 RX ORDER — LEVOTHYROXINE SODIUM 0.1 MG/1
100 TABLET ORAL
Qty: 90 | Refills: 0 | Status: ACTIVE | COMMUNITY
Start: 2023-12-15

## 2024-05-01 RX ORDER — MONTELUKAST 10 MG/1
10 TABLET, FILM COATED ORAL
Qty: 90 | Refills: 0 | Status: ACTIVE | COMMUNITY
Start: 2023-12-15

## 2024-05-01 RX ORDER — GABAPENTIN 300 MG/1
300 CAPSULE ORAL
Qty: 90 | Refills: 0 | Status: ACTIVE | COMMUNITY
Start: 2024-04-16

## 2024-05-01 RX ORDER — METFORMIN HYDROCHLORIDE 500 MG/1
500 TABLET, COATED ORAL
Qty: 180 | Refills: 0 | Status: ACTIVE | COMMUNITY
Start: 2024-01-22

## 2024-05-01 RX ORDER — OMEPRAZOLE 40 MG/1
40 CAPSULE, DELAYED RELEASE ORAL
Qty: 90 | Refills: 0 | Status: ACTIVE | COMMUNITY
Start: 2024-04-01

## 2024-05-01 RX ORDER — METOPROLOL SUCCINATE 25 MG/1
25 TABLET, EXTENDED RELEASE ORAL
Qty: 90 | Refills: 0 | Status: ACTIVE | COMMUNITY
Start: 2024-02-29

## 2024-05-01 RX ORDER — ALPRAZOLAM 0.25 MG/1
0.25 TABLET ORAL
Qty: 30 | Refills: 0 | Status: ACTIVE | COMMUNITY
Start: 2024-04-02

## 2024-05-01 RX ORDER — TORSEMIDE 20 MG/1
20 TABLET ORAL
Qty: 180 | Refills: 0 | Status: ACTIVE | COMMUNITY
Start: 2024-02-29

## 2024-05-02 ENCOUNTER — OUTPATIENT (OUTPATIENT)
Dept: OUTPATIENT SERVICES | Facility: HOSPITAL | Age: 74
LOS: 1 days | Discharge: HOME | End: 2024-05-02
Payer: MEDICARE

## 2024-05-02 DIAGNOSIS — Z98.890 OTHER SPECIFIED POSTPROCEDURAL STATES: Chronic | ICD-10-CM

## 2024-05-02 DIAGNOSIS — Z95.1 PRESENCE OF AORTOCORONARY BYPASS GRAFT: Chronic | ICD-10-CM

## 2024-05-03 ENCOUNTER — NON-APPOINTMENT (OUTPATIENT)
Age: 74
End: 2024-05-03

## 2024-05-07 ENCOUNTER — NON-APPOINTMENT (OUTPATIENT)
Age: 74
End: 2024-05-07

## 2024-05-09 ENCOUNTER — APPOINTMENT (OUTPATIENT)
Dept: RADIATION ONCOLOGY | Facility: CLINIC | Age: 74
End: 2024-05-09
Payer: MEDICARE

## 2024-05-09 VITALS
BODY MASS INDEX: 35.78 KG/M2 | HEART RATE: 84 BPM | RESPIRATION RATE: 17 BRPM | SYSTOLIC BLOOD PRESSURE: 127 MMHG | DIASTOLIC BLOOD PRESSURE: 66 MMHG | WEIGHT: 215 LBS | OXYGEN SATURATION: 86 %

## 2024-05-09 DIAGNOSIS — F41.9 ANXIETY DISORDER, UNSPECIFIED: ICD-10-CM

## 2024-05-09 DIAGNOSIS — I10 ESSENTIAL (PRIMARY) HYPERTENSION: ICD-10-CM

## 2024-05-09 DIAGNOSIS — Z78.9 OTHER SPECIFIED HEALTH STATUS: ICD-10-CM

## 2024-05-09 PROCEDURE — 99204 OFFICE O/P NEW MOD 45 MIN: CPT

## 2024-05-11 PROBLEM — F41.9 ANXIETY: Status: ACTIVE | Noted: 2024-05-01

## 2024-05-11 PROBLEM — Z78.9 NON-SMOKER: Status: ACTIVE | Noted: 2024-05-01

## 2024-05-11 PROBLEM — I10 HYPERTENSION, UNSPECIFIED TYPE: Status: ACTIVE | Noted: 2024-05-01

## 2024-05-11 NOTE — LETTER CLOSING
[Consult Closing:] : Thank you for allowing me to participate in the care of this patient.  If you have any questions, please do not hesitate to contact me. [Sincerely yours,] : Sincerely yours, [FreeTextEntry3] : Mikey Sorto MD Physician in Chief Department of Radiation Medicine [facility]   of Radiation Medicine [employer] at  [facility]  Radiation  [facility]/ [facility] at [place] [address]  Tel: [phone] Fax: [phone]

## 2024-05-11 NOTE — PHYSICAL EXAM
[Obese] : obese [Normal] : normoactive bowel sounds, soft and nontender, no hepatosplenomegaly or masses appreciated [de-identified] : pelvic exam deferred [de-identified] : antalgic, very slow gait, generally uses a walker , rarely a wheelchair,. [de-identified] : overtly anxioius by her own admission.

## 2024-05-11 NOTE — HISTORY OF PRESENT ILLNESS
[FreeTextEntry1] : This 73 year-old female presents for radiation medicine consultation accompanied by her .   Ms. 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  Ms. Alfred was hospitalized at [facility] for complicated UTI. US performed 4/9/24 revealed abnormal heterogenous and thickened endometrium again demonstrated.   4/5/2024 CT scan showed 3.5 cm in greatest dimension exophytic lesion off the right/anterior lower uterine segment most likely a fibroid.  Neither ovary was visible.  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   Patient denies vaginal bleeding/discharge, abdominal pain/bloating/distension, pelvic discomfort, changes in normal bowel habits, nausea/vomiting, unintentional weight loss/gain, and all other associated signs and symptoms. Patient with chronic urinary incontinence.  Follows with pulmonology for asthma & hypoxia. Endocrinology for diabetes. PCP/Cardiology for CAD (Dr. Ryan).

## 2024-05-11 NOTE — VITALS
[Maximal Pain Intensity: 7/10] : 7/10 [Least Pain Intensity: 3/10] : 3/10 [Pain Description/Quality: ___] : Pain description/quality: [unfilled] [Pain Duration: ___] : Pain duration: [unfilled] [Pain Location: ___] : Pain Location: [unfilled] [Pain Interferes with ADLs] : Pain interferes with activities of daily living. [Opioid] : opioid [60: Requires occasional assistance, but is able to care for most of his/her needs] : 60: Requires occasional assistance, but is able to care for most of his/her needs [ECOG Performance Status: 3 - Capable of only limited self care, confined to bed or chair more than 50% of waking hours] : Performance Status: 3 - Capable of only limited self care, confined to bed or chair more than 50% of waking hours [5 - Distress Level] : Distress Level: 5 [Patient given social work contact information and resource sheet] : Patient was given social work contact information and resource sheet

## 2024-05-11 NOTE — REVIEW OF SYSTEMS
[Negative] : Constitutional [FreeTextEntry5] : CABG x4 (2019), heart failure, hypertension [FreeTextEntry6] : Hx respiratory failure, ROMANA uses CPAP [FreeTextEntry9] : spinal stenosis, chronic back pain [FreeTextEntry8] : endometrial cancer, recurrent UTI's [de-identified] : hypothyroidism

## 2024-05-11 NOTE — PHYSICAL EXAM
[Obese] : obese [Normal] : normoactive bowel sounds, soft and nontender, no hepatosplenomegaly or masses appreciated [de-identified] : pelvic exam deferred [de-identified] : antalgic, very slow gait, generally uses a walker , rarely a wheelchair,. [de-identified] : overtly anxioius by her own admission.

## 2024-05-11 NOTE — HISTORY OF PRESENT ILLNESS
[FreeTextEntry1] : This 73 year-old female presents for radiation medicine consultation accompanied by her .   Ms. 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  Ms. Alfred was hospitalized at [facility] for complicated UTI. US performed 4/9/24 revealed abnormal heterogenous and thickened endometrium again demonstrated.   4/5/2024 CT scan showed 3.5 cm in greatest dimension exophytic lesion off the right/anterior lower uterine segment most likely a fibroid.  Neither ovary was visible.  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   Patient denies vaginal bleeding/discharge, abdominal pain/bloating/distension, pelvic discomfort, changes in normal bowel habits, nausea/vomiting, unintentional weight loss/gain, and all other associated signs and symptoms. Patient with chronic urinary incontinence.  Follows with pulmonology for asthma & hypoxia. Endocrinology for diabetes. PCP/Cardiology for CAD (Dr. Ryan).

## 2024-05-11 NOTE — LETTER CLOSING
[Consult Closing:] : Thank you for allowing me to participate in the care of this patient.  If you have any questions, please do not hesitate to contact me. [Sincerely yours,] : Sincerely yours, [FreeTextEntry3] : Mikey Sorto MD Physician in Chief Department of Radiation Medicine [facility]   of Radiation Medicine [employer] at  [facility]  Radiation  [facility]/ [facility] at [place] [address]  Tel: [phone] Fax: [phone]

## 2024-05-11 NOTE — REVIEW OF SYSTEMS
[Negative] : Constitutional [FreeTextEntry5] : CABG x4 (2019), heart failure, hypertension [FreeTextEntry6] : Hx respiratory failure, ROMANA uses CPAP [FreeTextEntry9] : spinal stenosis, chronic back pain [FreeTextEntry8] : endometrial cancer, recurrent UTI's [de-identified] : hypothyroidism

## 2024-05-11 NOTE — LETTER GREETING
[Dear Doctor] : Dear Doctor, [Consult Letter:] : Your patient, [unfilled] was seen in my office today for consultation. [Please see my note below.] : Please see my note below. [FreeTextEntry2] : Kayode Lopez MD

## 2024-05-11 NOTE — DISEASE MANAGEMENT
[Clinical] : TNM Stage: c [FreeTextEntry4] : endometrial adenocarcinoma [TTNM] : x [NTNM] : x [MTNM] : x [N/A] : Currently not applicable

## 2024-05-16 ENCOUNTER — APPOINTMENT (OUTPATIENT)
Dept: NUCLEAR MEDICINE | Facility: CLINIC | Age: 74
End: 2024-05-16
Payer: MEDICARE

## 2024-05-16 ENCOUNTER — OUTPATIENT (OUTPATIENT)
Dept: OUTPATIENT SERVICES | Facility: HOSPITAL | Age: 74
LOS: 1 days | End: 2024-05-16

## 2024-05-16 DIAGNOSIS — C54.1 MALIGNANT NEOPLASM OF ENDOMETRIUM: ICD-10-CM

## 2024-05-16 DIAGNOSIS — Z98.890 OTHER SPECIFIED POSTPROCEDURAL STATES: Chronic | ICD-10-CM

## 2024-05-16 PROCEDURE — 78815 PET IMAGE W/CT SKULL-THIGH: CPT | Mod: 26,PI

## 2024-05-22 ENCOUNTER — APPOINTMENT (OUTPATIENT)
Dept: GYNECOLOGIC ONCOLOGY | Facility: CLINIC | Age: 74
End: 2024-05-22
Payer: MEDICARE

## 2024-05-22 PROCEDURE — G2211 COMPLEX E/M VISIT ADD ON: CPT

## 2024-05-22 PROCEDURE — 99213 OFFICE O/P EST LOW 20 MIN: CPT

## 2024-05-22 NOTE — END OF VISIT
[FreeTextEntry3] : Follow up after tumor board discussion  [FreeTextEntry2] : Sonya Berger MA was present the entire duration of the patient interaction and gynecological exam.

## 2024-05-22 NOTE — PHYSICAL EXAM
[Chaperone Present] : A chaperone was present in the examining room during all aspects of the physical examination [77861] : A chaperone was present during the pelvic exam. [FreeTextEntry2] :  CHARLES Burnette was present the entire duration of the patient interaction and gynecological exam. [Normal] : Mood and affect: Normal [Fully active, able to carry on all pre-disease performance without restriction] : Status 0 - Fully active, able to carry on all pre-disease performance without restriction

## 2024-05-22 NOTE — HISTORY OF PRESENT ILLNESS
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 Pt and  were told about the endometrial cancer diagnosis. They understand that clear cell endometrial cancer is aggressive and unfortunately once it has spread it is typically not curable. Two primary options of treatment are surgery vs. radiation therapy although surgery is preferred if PET/CT is to show no evidence of spread so long as she is medically fit. Patient understands due to her ECOG score of a 3 her morbidity/mortality for this surgery is higher than that of the average person ~5% as compared to typically 1% and therefore pre-operative evaluations by her physicians will be very important in this process. Radiation on the other hand comes with its own set of side effects and does not allow us to fully stage her and thus give a true prognosis. Although there is a chance disease has not spread outside of the uterus visibly patient will likely require chemotherapy as an adjuvant therapy despite what she chooses to pursue primarily. Pt was presented at [facility] 5/7/24 and primary surgical intervention was recommended if pt medically optimized and was cleared from a pulm/cardio standpoint.  Pt now presents to office to finalize treatment plan  PET/CT (5/10/24): IMPRESSION: 1.  Focus of uptake within the endometrium, suspicious for malignant involvement. No gross evidence of metastatic disease. 2.  Suspected periosteal inclusion cyst within the LEFT maxillary sinus which exhibits increased uptake. Findings suspicious for possible infection. Consider ENT evaluation if warranted clinically. Consider CT or MRI if this will change patient management, although direct visualization may also be beneficial. 3.  Rest in body above.  She presents for follow up to discuss management decision. She reports getting pulmonary clearance, but not yet the cardiac clearance.

## 2024-05-22 NOTE — REASON FOR VISIT
[FreeTextEntry1] : [facility] [address] [place] [address] [phone]

## 2024-05-22 NOTE — ASSESSMENT
[FreeTextEntry1] : Pt is a 72 yo with clear cell adenocarcinoma of the endometrium. PET Ct without metastatic disease. We discussed our options for primary surgical management vs radiation. The patient has multiple medical co-morbidities. We entered her information into the ACS surgical risk calclucator, results will be uploaded in the chart. We discussed results in detail including up to a 30% risk of any complications with surgery. Expected stay of 3-4 days even with minimally invasive surgery. She still needs to get cardiac clearance. IF there is a problem that would push us toward recommending radiation, but generally given her medical issues and risk of complications I am leaning toward primary radiation treatment. We will discuss her case at TB next week. If radiation doctors feel that can come up with good treatment that has a good chance of treating her cancer I would prefer primary radiation. We discussed that in the event of recurrence surgery will not be an option given risks associated with operation in a surgical field.

## 2024-05-23 NOTE — ASSESSMENT
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   I discussed with pt and her  above diagnosis. They understand that clear cell endometrial cancer is aggressive and unfortunately once it has spread it is typically not curable. Two primary options of treatment are surgery vs. radiation therapy although surgery is preferred if PET/CT is to show no evidence of spread so long as she is medically fit. Patient understands due to her ECOG score of a 3 her morbidity/mortality for this surgery is higher than that of the average person ~5% as compared to typically 1% and therefore pre-operative evaluations by her physicians will be very important in this process. Radiation on the other hand comes with its own set of side effects and does not allow us to fully stage her and thus give a true prognosis. Although there is a chance disease has not spread outside of the uterus visibly patient will likely require chemotherapy as an adjuvant therapy despite what she chooses to pursue primarily. I would like patient to meet with Rad/Onc to discuss this option in more detail. Will present her case at GYN/ONC [facility] 5/7/24 with follow-up thereafter. She will also begin to make her clearance appointments so as to not delay any care if she is to pursue surgical management.   I stressed the importance of recommendation for soap suds enema to alleviate possible impaction palpated on bimanual exam today.

## 2024-05-23 NOTE — PHYSICAL EXAM
[Chaperone Present] : A chaperone was present in the examining room during all aspects of the physical examination [78540] : A chaperone was present during the pelvic exam. [Normal] : Bimanual Exam: Normal [FreeTextEntry2] : Lucia Moody PA-C

## 2024-05-23 NOTE — END OF VISIT
[FreeTextEntry3] :  I, Dr. Kayode Lopez personally performed the evaluation and management (E/M) services for this new patient. That E/M includes conducting the initial examination, assessing all conditions, and establishing the plan of care.  Today, Nevin Moody PA-C, was here to observe my evaluation and management services for this patient to be followed going forward.

## 2024-05-23 NOTE — PLAN
[TextEntry] : F/u in one week in office to finalize treatment plan TMB presentation PET/CT Patient to schedule Medical/Cardiology (Dr. Ryan) & Pulmonology clearances.

## 2024-05-23 NOTE — HISTORY OF PRESENT ILLNESS
[FreeTextEntry1] : 72 y/o nulligravida LMP in her 30's being referred by Dr. Didi Baires for Endometrial CA. Patient hospitalized at [facility] for complicated UTI. US performed 4/9/24 revealed abnormal heterogenous and thickened endometrium again demonstrated. This warrants workup to exclude endometrial neoplasm which could appear this way. 3.3 cm in greatest dimension exophytic lesion off the right/anterior lower uterine segment most likely a fibroid. Neither ovary was visible. If it is clinically necessary to more definitively image these structures, GYN protocol MRI with and without gadolinium would do so. 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 MMR testing with expression. Pt denies vaginal bleeding/discharge, abdominal pain/bloating/distension, pelvic discomfort, changes in normal bowel habits, nausea/vomiting, unintentional weight loss/gain, and all other associated signs and symptoms. Patient with chronic urinary incontinence.   Pt follows with pulmonology for asthma & hypoxia. Endocrinology for diabetes. PCP/Cardiology for CAD (Dr. Ryan).   Last Pap: 2024: normal pp Last Mammogram: >20 years ago, normal pp Last Colonoscopy: >20 years, normal pp Last Dexa: >20 years ago, normal pp

## 2024-05-23 NOTE — CHIEF COMPLAINT
[FreeTextEntry1] : [facility] [phone] at [address] [place] [address]

## 2024-05-28 ENCOUNTER — NON-APPOINTMENT (OUTPATIENT)
Age: 74
End: 2024-05-28

## 2024-05-29 ENCOUNTER — APPOINTMENT (OUTPATIENT)
Dept: GYNECOLOGIC ONCOLOGY | Facility: CLINIC | Age: 74
End: 2024-05-29
Payer: MEDICARE

## 2024-05-29 PROCEDURE — 99212 OFFICE O/P EST SF 10 MIN: CPT

## 2024-05-29 PROCEDURE — G2211 COMPLEX E/M VISIT ADD ON: CPT

## 2024-05-29 NOTE — ASSESSMENT
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 She was presented at TB and presents to discuss recommendations. TB agrees with proceeding with radiation treatment given high risk of complications with surgery. We will follow up treatment success with additional scans such as PET Ct.

## 2024-05-29 NOTE — HISTORY OF PRESENT ILLNESS
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 She was presented at TB and presents to discuss recommendations.

## 2024-05-29 NOTE — END OF VISIT
[FreeTextEntry3] : Follow up with Noreen to start planning radiation  [FreeTextEntry2] : Sonya Berger MA was present the entire duration of the patient interaction and gynecological exam.

## 2024-06-05 ENCOUNTER — APPOINTMENT (OUTPATIENT)
Dept: RADIATION ONCOLOGY | Facility: CLINIC | Age: 74
End: 2024-06-05
Payer: MEDICARE

## 2024-06-05 VITALS
RESPIRATION RATE: 16 BRPM | HEIGHT: 65 IN | SYSTOLIC BLOOD PRESSURE: 128 MMHG | OXYGEN SATURATION: 91 % | DIASTOLIC BLOOD PRESSURE: 73 MMHG | HEART RATE: 84 BPM

## 2024-06-05 DIAGNOSIS — R09.02 HYPOXEMIA: ICD-10-CM

## 2024-06-05 DIAGNOSIS — C54.1 MALIGNANT NEOPLASM OF ENDOMETRIUM: ICD-10-CM

## 2024-06-05 DIAGNOSIS — E78.5 HYPERLIPIDEMIA, UNSPECIFIED: ICD-10-CM

## 2024-06-05 DIAGNOSIS — E13.9 OTHER SPECIFIED DIABETES MELLITUS W/OUT COMPLICATIONS: ICD-10-CM

## 2024-06-05 PROCEDURE — 99213 OFFICE O/P EST LOW 20 MIN: CPT

## 2024-06-06 DIAGNOSIS — C54.1 MALIGNANT NEOPLASM OF ENDOMETRIUM: ICD-10-CM

## 2024-06-06 PROBLEM — R09.02 HYPOXIA: Status: ACTIVE | Noted: 2024-05-01

## 2024-06-06 PROBLEM — E13.9 DIABETES MELLITUS OF OTHER TYPE WITHOUT COMPLICATION: Status: ACTIVE | Noted: 2024-05-01

## 2024-06-06 PROBLEM — E78.5 HYPERLIPIDEMIA, UNSPECIFIED HYPERLIPIDEMIA TYPE: Status: ACTIVE | Noted: 2024-05-01

## 2024-06-06 NOTE — DATA REVIEWED
[FreeTextEntry1] : PET/CT 5/16/2024 IMPRESSION: 1.  Focus of uptake within the endometrium, suspicious for malignant involvement. No gross evidence of metastatic disease. 2.  Suspected periosteal inclusion cyst within the LEFT maxillary sinus which exhibits increased uptake. Findings suspicious for possible infection. Consider ENT evaluation if warranted clinically. Consider CT or MRI if this will change patient management, although direct visualization may also be beneficial.

## 2024-06-06 NOTE — DISEASE MANAGEMENT
[Clinical] : TNM Stage: c [N/A] : Currently not applicable [FreeTextEntry4] : endometrial adenocarcinoma [TTNM] : x [NTNM] : x [MTNM] : x

## 2024-06-06 NOTE — LETTER CLOSING
[Consult Closing:] : Thank you for allowing me to participate in the care of this patient.  If you have any questions, please do not hesitate to contact me. [Sincerely yours,] : Sincerely yours, [FreeTextEntry3] : Mikey Sorto MD Physician in Chief Department of Radiation Medicine [facility]   of Radiation Medicine [employer] at  [facility]  Radiation  [facility]/ [facility] at [place] [address]  Tel: [phone] Fax: [phone]

## 2024-06-06 NOTE — VITALS
[Maximal Pain Intensity: 0/10] : 0/10 [Least Pain Intensity: 0/10] : 0/10 [60: Requires occasional assistance, but is able to care for most of his/her needs] : 60: Requires occasional assistance, but is able to care for most of his/her needs [ECOG Performance Status: 3 - Capable of only limited self care, confined to bed or chair more than 50% of waking hours] : Performance Status: 3 - Capable of only limited self care, confined to bed or chair more than 50% of waking hours [4 - Distress Level] : Distress Level: 4

## 2024-06-06 NOTE — REVIEW OF SYSTEMS
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

## 2024-06-06 NOTE — HISTORY OF PRESENT ILLNESS
[FreeTextEntry1] : This 73 year-old female returns for radiation medicine follow-up and to discuss results of PET/CT done 4/16/2024.   Ms. Alfred was diagnosed with Endometrial adenocarcinoma via endometrial biopsy on 4/10/24.  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  Ms. Alfred was hospitalized at [facility] for complicated UTI. US performed 4/9/24 revealed abnormal heterogenous and thickened endometrium again demonstrated.   4/5/2024 CT scan showed 3.5 cm in greatest dimension exophytic lesion off the right/anterior lower uterine segment most likely a fibroid.  Neither ovary was visible.  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   Patient denies vaginal bleeding/discharge, abdominal pain/bloating/distension, pelvic discomfort, changes in normal bowel habits, nausea/vomiting, unintentional weight loss/gain, and all other associated signs and symptoms. Patient with chronic urinary incontinence.  Follows with pulmonology for asthma & hypoxia. Endocrinology for diabetes. PCP/Cardiology for CAD (Dr. Ryan).  I have personally evaluated and examined the patient. The Attending was available to me as a supervising provider if needed.

## 2024-06-06 NOTE — PHYSICAL EXAM
[Obese] : obese [Normal] : oriented to person, place and time, the affect was normal, the mood was normal and not anxious [FreeTextEntry1] : deferred [de-identified] : pelvic exam deferred [de-identified] : antalgic, very slow gait, generally uses a walker , rarely a wheelchair,. [de-identified] : overtly anxioius by her own admission.

## 2024-06-06 NOTE — ASSESSMENT
[Work-up necessary to assess local, regional or metastatic recurrence] : Work-up necessary to assess local, regional or metastatic recurrence [FreeTextEntry1] : Patient has been deemed medically unfit for surgical resection and has therefore been referred for definitive not-surgical treatment for her endometrial cancer.

## 2024-06-11 PROCEDURE — 77332 RADIATION TREATMENT AID(S): CPT | Mod: 26

## 2024-06-11 PROCEDURE — 77263 THER RADIOLOGY TX PLNG CPLX: CPT

## 2024-06-24 PROCEDURE — 77300 RADIATION THERAPY DOSE PLAN: CPT | Mod: 26

## 2024-06-24 PROCEDURE — 77338 DESIGN MLC DEVICE FOR IMRT: CPT | Mod: 26

## 2024-06-24 PROCEDURE — 77301 RADIOTHERAPY DOSE PLAN IMRT: CPT | Mod: 26

## 2024-06-30 ENCOUNTER — NON-APPOINTMENT (OUTPATIENT)
Age: 74
End: 2024-06-30

## 2024-07-01 VITALS
BODY MASS INDEX: 35.78 KG/M2 | DIASTOLIC BLOOD PRESSURE: 62 MMHG | HEART RATE: 84 BPM | WEIGHT: 215 LBS | SYSTOLIC BLOOD PRESSURE: 164 MMHG | OXYGEN SATURATION: 96 % | RESPIRATION RATE: 16 BRPM

## 2024-07-01 PROCEDURE — 77014: CPT | Mod: 26

## 2024-07-01 PROCEDURE — 77427 RADIATION TX MANAGEMENT X5: CPT

## 2024-07-02 PROCEDURE — 77014: CPT | Mod: 26

## 2024-07-03 PROCEDURE — 77387B: CUSTOM | Mod: 26

## 2024-07-03 RX ORDER — ONDANSETRON 4 MG/1
4 TABLET, ORALLY DISINTEGRATING ORAL
Qty: 30 | Refills: 0 | Status: ACTIVE | COMMUNITY
Start: 2024-07-03 | End: 1900-01-01

## 2024-07-05 ENCOUNTER — NON-APPOINTMENT (OUTPATIENT)
Age: 74
End: 2024-07-05

## 2024-07-05 PROCEDURE — 77387B: CUSTOM | Mod: 26

## 2024-07-08 ENCOUNTER — NON-APPOINTMENT (OUTPATIENT)
Age: 74
End: 2024-07-08

## 2024-07-08 VITALS
DIASTOLIC BLOOD PRESSURE: 81 MMHG | RESPIRATION RATE: 16 BRPM | SYSTOLIC BLOOD PRESSURE: 131 MMHG | HEART RATE: 72 BPM | OXYGEN SATURATION: 97 %

## 2024-07-08 PROCEDURE — 77014: CPT | Mod: 26

## 2024-07-09 PROCEDURE — 77387B: CUSTOM | Mod: 26

## 2024-07-09 PROCEDURE — 77427 RADIATION TX MANAGEMENT X5: CPT

## 2024-07-11 PROCEDURE — 77387B: CUSTOM | Mod: 26

## 2024-07-12 PROCEDURE — 77387B: CUSTOM | Mod: 26

## 2024-07-15 ENCOUNTER — NON-APPOINTMENT (OUTPATIENT)
Age: 74
End: 2024-07-15

## 2024-07-15 VITALS
DIASTOLIC BLOOD PRESSURE: 78 MMHG | WEIGHT: 215 LBS | SYSTOLIC BLOOD PRESSURE: 128 MMHG | BODY MASS INDEX: 35.78 KG/M2 | RESPIRATION RATE: 16 BRPM | OXYGEN SATURATION: 97 % | HEART RATE: 76 BPM

## 2024-07-15 DIAGNOSIS — T75.3XXA MOTION SICKNESS, INITIAL ENCOUNTER: ICD-10-CM

## 2024-07-15 PROCEDURE — 77014: CPT | Mod: 26

## 2024-07-15 RX ORDER — SCOPOLAMINE 1.5 MG/1
1 PATCH, EXTENDED RELEASE TRANSDERMAL
Qty: 10 | Refills: 0 | Status: ACTIVE | COMMUNITY
Start: 2024-07-15 | End: 1900-01-01

## 2024-07-16 PROCEDURE — 77387B: CUSTOM | Mod: 26

## 2024-07-17 PROCEDURE — 77387B: CUSTOM | Mod: 26

## 2024-07-17 PROCEDURE — 77427 RADIATION TX MANAGEMENT X5: CPT

## 2024-07-18 PROCEDURE — 77387B: CUSTOM | Mod: 26

## 2024-07-19 PROCEDURE — 77387B: CUSTOM | Mod: 26

## 2024-07-22 ENCOUNTER — NON-APPOINTMENT (OUTPATIENT)
Age: 74
End: 2024-07-22

## 2024-07-22 VITALS
OXYGEN SATURATION: 95 % | SYSTOLIC BLOOD PRESSURE: 147 MMHG | DIASTOLIC BLOOD PRESSURE: 82 MMHG | HEIGHT: 65 IN | RESPIRATION RATE: 16 BRPM | HEART RATE: 75 BPM

## 2024-07-22 PROCEDURE — 77014: CPT | Mod: 26

## 2024-07-22 NOTE — DISEASE MANAGEMENT
[Clinical] : TNM Stage: c [FreeTextEntry4] : endometrial carcinoma [TTNM] : x [NTNM] : x [MTNM] : x [N/A] : Currently not applicable [de-identified] : 0617cGy [[place] [address]] : 3637 [de-identified] : pelvis/endometrium

## 2024-07-22 NOTE — VITALS
[Maximal Pain Intensity: 8/10] : 8/10 [Pain Description/Quality: ___] : Pain description/quality: [unfilled] [Pain Duration: ___] : Pain duration: [unfilled] [Pain Location: ___] : Pain Location: [unfilled] [Pain Interferes with ADLs] : Pain interferes with activities of daily living. [Opioid] : opioid [70: Cares for self; unalbe to carry on normal activity or do active work.] : 70: Cares for self; unable to carry on normal activity or do active work.

## 2024-07-23 PROCEDURE — 77387B: CUSTOM | Mod: 26

## 2024-07-24 PROCEDURE — 77427 RADIATION TX MANAGEMENT X5: CPT

## 2024-07-24 PROCEDURE — 77387B: CUSTOM | Mod: 26

## 2024-07-25 PROCEDURE — 77387B: CUSTOM | Mod: 26

## 2024-07-26 PROCEDURE — 77387B: CUSTOM | Mod: 26

## 2024-07-29 ENCOUNTER — NON-APPOINTMENT (OUTPATIENT)
Age: 74
End: 2024-07-29

## 2024-07-29 VITALS
SYSTOLIC BLOOD PRESSURE: 131 MMHG | RESPIRATION RATE: 15 BRPM | DIASTOLIC BLOOD PRESSURE: 80 MMHG | HEART RATE: 76 BPM | OXYGEN SATURATION: 97 %

## 2024-07-29 PROCEDURE — 77014: CPT | Mod: 26

## 2024-07-29 NOTE — VITALS
[Maximal Pain Intensity: 8/10] : 8/10 [Least Pain Intensity: 1/10] : 1/10 [Pain Description/Quality: ___] : Pain description/quality: [unfilled] [Pain Duration: ___] : Pain duration: [unfilled] [Pain Location: ___] : Pain Location: [unfilled] [Pain Interferes with ADLs] : Pain interferes with activities of daily living. [Opioid] : opioid [70: Cares for self; unalbe to carry on normal activity or do active work.] : 70: Cares for self; unable to carry on normal activity or do active work.

## 2024-07-29 NOTE — DISEASE MANAGEMENT
[Clinical] : TNM Stage: c [FreeTextEntry4] : endometrial carcinoma [TTNM] : x [NTNM] : x [MTNM] : x [N/A] : Currently not applicable [de-identified] : 3420 cGy [[place] [address]] : 8182 [de-identified] : pelvis/endometrium

## 2024-07-30 PROCEDURE — 77387B: CUSTOM | Mod: 26

## 2024-07-31 PROCEDURE — 77387B: CUSTOM | Mod: 26

## 2024-08-01 PROCEDURE — 77387B: CUSTOM | Mod: 26

## 2024-08-01 PROCEDURE — 77427 RADIATION TX MANAGEMENT X5: CPT

## 2024-08-02 PROCEDURE — 77387B: CUSTOM | Mod: 26

## 2024-08-05 PROCEDURE — 77387B: CUSTOM | Mod: 26

## 2024-08-06 ENCOUNTER — OUTPATIENT (OUTPATIENT)
Dept: OUTPATIENT SERVICES | Facility: HOSPITAL | Age: 74
LOS: 1 days | Discharge: HOME | End: 2024-08-06

## 2024-08-06 ENCOUNTER — NON-APPOINTMENT (OUTPATIENT)
Age: 74
End: 2024-08-06

## 2024-08-06 ENCOUNTER — OUTPATIENT (OUTPATIENT)
Dept: OUTPATIENT SERVICES | Facility: HOSPITAL | Age: 74
LOS: 1 days | Discharge: HOME | End: 2024-08-06
Payer: MEDICARE

## 2024-08-06 DIAGNOSIS — Z98.890 OTHER SPECIFIED POSTPROCEDURAL STATES: Chronic | ICD-10-CM

## 2024-08-06 DIAGNOSIS — Z95.1 PRESENCE OF AORTOCORONARY BYPASS GRAFT: Chronic | ICD-10-CM

## 2024-08-06 PROCEDURE — 77387B: CUSTOM | Mod: 26

## 2024-08-06 NOTE — DISEASE MANAGEMENT
[Clinical] : TNM Stage: c [FreeTextEntry4] : endometrial carcinoma [TTNM] : x [NTNM] : x [MTNM] : x [N/A] : Currently not applicable [de-identified] : 4500 cGy [[place] [address]] : 7438 [de-identified] : pelvis/endometrium

## 2024-08-06 NOTE — VITALS
[Maximal Pain Intensity: 6/10] : 6/10 [Least Pain Intensity: 1/10] : 1/10 [Pain Description/Quality: ___] : Pain description/quality: [unfilled] [Pain Duration: ___] : Pain duration: [unfilled] [Pain Location: ___] : Pain Location: [unfilled] [Pain Interferes with ADLs] : Pain interferes with activities of daily living. [Opioid] : opioid [70: Cares for self; unalbe to carry on normal activity or do active work.] : 70: Cares for self; unable to carry on normal activity or do active work.

## 2024-08-07 ENCOUNTER — OUTPATIENT (OUTPATIENT)
Dept: OUTPATIENT SERVICES | Facility: HOSPITAL | Age: 74
LOS: 1 days | End: 2024-08-07

## 2024-08-07 ENCOUNTER — APPOINTMENT (OUTPATIENT)
Dept: MRI IMAGING | Facility: CLINIC | Age: 74
End: 2024-08-07

## 2024-08-07 DIAGNOSIS — Z95.1 PRESENCE OF AORTOCORONARY BYPASS GRAFT: Chronic | ICD-10-CM

## 2024-08-07 DIAGNOSIS — Z98.890 OTHER SPECIFIED POSTPROCEDURAL STATES: Chronic | ICD-10-CM

## 2024-08-07 DIAGNOSIS — C54.1 MALIGNANT NEOPLASM OF ENDOMETRIUM: ICD-10-CM

## 2024-08-07 PROCEDURE — 72197 MRI PELVIS W/O & W/DYE: CPT | Mod: 26

## 2024-08-08 PROCEDURE — 77263 THER RADIOLOGY TX PLNG CPLX: CPT

## 2024-08-08 PROCEDURE — 77333 RADIATION TREATMENT AID(S): CPT | Mod: 26

## 2024-08-12 ENCOUNTER — NON-APPOINTMENT (OUTPATIENT)
Age: 74
End: 2024-08-12

## 2024-08-23 PROCEDURE — 77338 DESIGN MLC DEVICE FOR IMRT: CPT | Mod: 26

## 2024-08-23 PROCEDURE — 77301 RADIOTHERAPY DOSE PLAN IMRT: CPT | Mod: 26

## 2024-08-23 PROCEDURE — 77300 RADIATION THERAPY DOSE PLAN: CPT | Mod: 26

## 2024-08-26 ENCOUNTER — NON-APPOINTMENT (OUTPATIENT)
Age: 74
End: 2024-08-26

## 2024-08-26 NOTE — HISTORY OF PRESENT ILLNESS
[FreeTextEntry1] : Ms. Alfred is a 73 y/o woman with chronic pain, diagnosed with a clear cell endometrial cancer, undergoing Radiation Treatment to pelvis.  She ws not a surgical candidate and therefore has an intact uterus. Completed EBRT 5000cGy in 25 fractions to the pelvis Currently undergoing SBRT to the pelvis

## 2024-08-26 NOTE — DISEASE MANAGEMENT
[Clinical] : TNM Stage: c [TTNM] : x [MTNM] : x [NTNM] : x [N/A] : Currently not applicable [de-identified] : 900 [de-identified] : 3000cGy [de-identified] : SBRT Pelvis

## 2024-09-06 PROCEDURE — 77435 SBRT MANAGEMENT: CPT

## 2024-09-09 ENCOUNTER — NON-APPOINTMENT (OUTPATIENT)
Age: 74
End: 2024-09-09

## 2024-09-09 VITALS
DIASTOLIC BLOOD PRESSURE: 63 MMHG | SYSTOLIC BLOOD PRESSURE: 126 MMHG | RESPIRATION RATE: 16 BRPM | OXYGEN SATURATION: 99 % | HEART RATE: 73 BPM

## 2024-09-09 NOTE — DISEASE MANAGEMENT
[Clinical] : TNM Stage: c [FreeTextEntry4] : endometrial carcinoma [TTNM] : x [NTNM] : x [MTNM] : x [N/A] : Currently not applicable [de-identified] : 4500 cGy pelvis/endometrium; 3000 cGy cervical boost

## 2024-10-08 ENCOUNTER — APPOINTMENT (OUTPATIENT)
Dept: RADIATION ONCOLOGY | Facility: CLINIC | Age: 74
End: 2024-10-08

## 2024-10-08 VITALS — SYSTOLIC BLOOD PRESSURE: 128 MMHG | OXYGEN SATURATION: 96 % | DIASTOLIC BLOOD PRESSURE: 77 MMHG | HEART RATE: 75 BPM

## 2024-10-08 PROCEDURE — 99212 OFFICE O/P EST SF 10 MIN: CPT

## 2024-10-08 PROCEDURE — G2211 COMPLEX E/M VISIT ADD ON: CPT

## 2024-10-08 RX ORDER — ONDANSETRON 8 MG/1
8 TABLET, ORALLY DISINTEGRATING ORAL EVERY 8 HOURS
Qty: 21 | Refills: 1 | Status: ACTIVE | COMMUNITY
Start: 2024-10-08 | End: 1900-01-01

## 2024-10-09 ENCOUNTER — APPOINTMENT (OUTPATIENT)
Dept: RADIATION ONCOLOGY | Facility: CLINIC | Age: 74
End: 2024-10-09

## 2025-01-09 ENCOUNTER — APPOINTMENT (OUTPATIENT)
Dept: RADIATION ONCOLOGY | Facility: CLINIC | Age: 75
End: 2025-01-09

## 2025-01-29 ENCOUNTER — APPOINTMENT (OUTPATIENT)
Dept: RADIATION ONCOLOGY | Facility: CLINIC | Age: 75
End: 2025-01-29
Payer: MEDICARE

## 2025-01-29 VITALS
HEART RATE: 75 BPM | RESPIRATION RATE: 16 BRPM | OXYGEN SATURATION: 90 % | SYSTOLIC BLOOD PRESSURE: 142 MMHG | HEIGHT: 65 IN | DIASTOLIC BLOOD PRESSURE: 79 MMHG

## 2025-01-29 DIAGNOSIS — Z92.3 PERSONAL HISTORY OF IRRADIATION: ICD-10-CM

## 2025-01-29 DIAGNOSIS — C54.1 MALIGNANT NEOPLASM OF ENDOMETRIUM: ICD-10-CM

## 2025-01-29 PROCEDURE — 99213 OFFICE O/P EST LOW 20 MIN: CPT

## 2025-02-02 PROBLEM — Z92.3 PERSONAL HISTORY OF RADIATION THERAPY: Status: ACTIVE | Noted: 2025-02-02

## 2025-07-16 ENCOUNTER — APPOINTMENT (OUTPATIENT)
Dept: RADIATION ONCOLOGY | Facility: CLINIC | Age: 75
End: 2025-07-16
Payer: MEDICARE

## 2025-07-16 VITALS
RESPIRATION RATE: 16 BRPM | HEART RATE: 81 BPM | DIASTOLIC BLOOD PRESSURE: 67 MMHG | OXYGEN SATURATION: 96 % | HEIGHT: 65 IN | SYSTOLIC BLOOD PRESSURE: 112 MMHG

## 2025-07-16 PROCEDURE — 99213 OFFICE O/P EST LOW 20 MIN: CPT

## 2025-08-08 ENCOUNTER — APPOINTMENT (OUTPATIENT)
Dept: CT IMAGING | Facility: CLINIC | Age: 75
End: 2025-08-08
Payer: MEDICARE

## 2025-08-08 ENCOUNTER — OUTPATIENT (OUTPATIENT)
Dept: OUTPATIENT SERVICES | Facility: HOSPITAL | Age: 75
LOS: 1 days | End: 2025-08-08

## 2025-08-08 DIAGNOSIS — Z98.890 OTHER SPECIFIED POSTPROCEDURAL STATES: Chronic | ICD-10-CM

## 2025-08-08 DIAGNOSIS — Z95.1 PRESENCE OF AORTOCORONARY BYPASS GRAFT: Chronic | ICD-10-CM

## 2025-08-08 DIAGNOSIS — C54.1 MALIGNANT NEOPLASM OF ENDOMETRIUM: ICD-10-CM

## 2025-08-08 PROCEDURE — 74177 CT ABD & PELVIS W/CONTRAST: CPT | Mod: 26

## 2025-09-03 ENCOUNTER — APPOINTMENT (OUTPATIENT)
Dept: GYNECOLOGIC ONCOLOGY | Facility: CLINIC | Age: 75
End: 2025-09-03
Payer: MEDICARE

## 2025-09-03 VITALS
HEIGHT: 65 IN | HEART RATE: 91 BPM | WEIGHT: 215 LBS | SYSTOLIC BLOOD PRESSURE: 150 MMHG | BODY MASS INDEX: 35.82 KG/M2 | DIASTOLIC BLOOD PRESSURE: 92 MMHG | OXYGEN SATURATION: 99 %

## 2025-09-03 VITALS
OXYGEN SATURATION: 93 % | HEIGHT: 65 IN | SYSTOLIC BLOOD PRESSURE: 91 MMHG | HEART RATE: 91 BPM | RESPIRATION RATE: 16 BRPM | DIASTOLIC BLOOD PRESSURE: 58 MMHG

## 2025-09-03 DIAGNOSIS — C54.1 MALIGNANT NEOPLASM OF ENDOMETRIUM: ICD-10-CM

## 2025-09-03 PROCEDURE — 99214 OFFICE O/P EST MOD 30 MIN: CPT

## 2025-09-03 PROCEDURE — 99459 PELVIC EXAMINATION: CPT

## 2025-09-03 PROCEDURE — G2211 COMPLEX E/M VISIT ADD ON: CPT

## (undated) DEVICE — PACK LITHOTOMY

## (undated) DEVICE — WARMING BLANKET UPPER ADULT

## (undated) DEVICE — LAP PAD W RING 18 X 18"

## (undated) DEVICE — PREP DYNA-HEX CHG 4% 4OZ BOTTLE (BACTOSHIELD)

## (undated) DEVICE — PREP TRAY DRY SKIN PREP SCRUB

## (undated) DEVICE — DRSG TELFA 3 X 4

## (undated) DEVICE — URETERAL CATH RED RUBBER 16FR (ORANGE)

## (undated) DEVICE — DRAPE LIGHT HANDLE COVER (GREEN)

## (undated) DEVICE — GLV 6 PROTEXIS (WHITE)

## (undated) DEVICE — VENODYNE/SCD SLEEVE CALF MEDIUM

## (undated) DEVICE — DRAPE 1/2 SHEET 40X57"